# Patient Record
Sex: FEMALE | Race: BLACK OR AFRICAN AMERICAN | NOT HISPANIC OR LATINO | URBAN - METROPOLITAN AREA
[De-identification: names, ages, dates, MRNs, and addresses within clinical notes are randomized per-mention and may not be internally consistent; named-entity substitution may affect disease eponyms.]

---

## 2017-04-20 PROBLEM — Z00.00 ENCOUNTER FOR PREVENTIVE HEALTH EXAMINATION: Status: ACTIVE | Noted: 2017-04-20

## 2017-04-21 ENCOUNTER — OUTPATIENT (OUTPATIENT)
Dept: OUTPATIENT SERVICES | Facility: HOSPITAL | Age: 27
LOS: 1 days | End: 2017-04-21
Payer: COMMERCIAL

## 2017-04-21 ENCOUNTER — APPOINTMENT (OUTPATIENT)
Dept: CT IMAGING | Facility: CLINIC | Age: 27
End: 2017-04-21

## 2017-04-21 DIAGNOSIS — Z00.8 ENCOUNTER FOR OTHER GENERAL EXAMINATION: ICD-10-CM

## 2017-04-21 PROCEDURE — 70490 CT SOFT TISSUE NECK W/O DYE: CPT

## 2017-05-05 ENCOUNTER — APPOINTMENT (OUTPATIENT)
Dept: ULTRASOUND IMAGING | Facility: CLINIC | Age: 27
End: 2017-05-05

## 2017-05-05 ENCOUNTER — OUTPATIENT (OUTPATIENT)
Dept: OUTPATIENT SERVICES | Facility: HOSPITAL | Age: 27
LOS: 1 days | End: 2017-05-05
Payer: COMMERCIAL

## 2017-05-05 ENCOUNTER — RESULT REVIEW (OUTPATIENT)
Age: 27
End: 2017-05-05

## 2017-05-05 DIAGNOSIS — R22.1 LOCALIZED SWELLING, MASS AND LUMP, NECK: ICD-10-CM

## 2017-05-05 PROCEDURE — 76942 ECHO GUIDE FOR BIOPSY: CPT

## 2017-05-05 PROCEDURE — 10022: CPT

## 2017-06-12 ENCOUNTER — APPOINTMENT (OUTPATIENT)
Dept: OTOLARYNGOLOGY | Facility: CLINIC | Age: 27
End: 2017-06-12

## 2017-06-12 VITALS
WEIGHT: 293 LBS | HEART RATE: 80 BPM | HEIGHT: 70 IN | SYSTOLIC BLOOD PRESSURE: 141 MMHG | DIASTOLIC BLOOD PRESSURE: 87 MMHG | BODY MASS INDEX: 41.95 KG/M2

## 2017-06-12 DIAGNOSIS — Z80.3 FAMILY HISTORY OF MALIGNANT NEOPLASM OF BREAST: ICD-10-CM

## 2017-06-12 DIAGNOSIS — J45.909 UNSPECIFIED ASTHMA, UNCOMPLICATED: ICD-10-CM

## 2017-06-12 DIAGNOSIS — Z82.49 FAMILY HISTORY OF ISCHEMIC HEART DISEASE AND OTHER DISEASES OF THE CIRCULATORY SYSTEM: ICD-10-CM

## 2017-06-12 DIAGNOSIS — Z78.9 OTHER SPECIFIED HEALTH STATUS: ICD-10-CM

## 2017-07-06 ENCOUNTER — FORM ENCOUNTER (OUTPATIENT)
Age: 27
End: 2017-07-06

## 2017-07-07 ENCOUNTER — OUTPATIENT (OUTPATIENT)
Dept: OUTPATIENT SERVICES | Facility: HOSPITAL | Age: 27
LOS: 1 days | End: 2017-07-07
Payer: COMMERCIAL

## 2017-07-07 ENCOUNTER — APPOINTMENT (OUTPATIENT)
Dept: ULTRASOUND IMAGING | Facility: CLINIC | Age: 27
End: 2017-07-07

## 2017-07-07 DIAGNOSIS — R59.0 LOCALIZED ENLARGED LYMPH NODES: ICD-10-CM

## 2017-07-07 PROCEDURE — 76536 US EXAM OF HEAD AND NECK: CPT

## 2017-07-14 ENCOUNTER — APPOINTMENT (OUTPATIENT)
Dept: OTOLARYNGOLOGY | Facility: CLINIC | Age: 27
End: 2017-07-14

## 2017-07-14 VITALS
BODY MASS INDEX: 41.95 KG/M2 | OXYGEN SATURATION: 98 % | RESPIRATION RATE: 16 BRPM | SYSTOLIC BLOOD PRESSURE: 120 MMHG | HEIGHT: 70 IN | DIASTOLIC BLOOD PRESSURE: 80 MMHG | WEIGHT: 293 LBS | HEART RATE: 87 BPM

## 2017-07-16 ENCOUNTER — EMERGENCY (EMERGENCY)
Facility: HOSPITAL | Age: 27
LOS: 1 days | Discharge: HOME | End: 2017-07-16
Attending: EMERGENCY MEDICINE | Admitting: EMERGENCY MEDICINE
Payer: COMMERCIAL

## 2017-07-16 VITALS
DIASTOLIC BLOOD PRESSURE: 61 MMHG | SYSTOLIC BLOOD PRESSURE: 137 MMHG | RESPIRATION RATE: 18 BRPM | TEMPERATURE: 99 F | HEART RATE: 90 BPM | OXYGEN SATURATION: 99 %

## 2017-07-16 DIAGNOSIS — Z98.890 OTHER SPECIFIED POSTPROCEDURAL STATES: Chronic | ICD-10-CM

## 2017-07-16 LAB
ALBUMIN SERPL ELPH-MCNC: 3.9 G/DL — SIGNIFICANT CHANGE UP (ref 3.3–5)
ALP SERPL-CCNC: 89 U/L — SIGNIFICANT CHANGE UP (ref 40–120)
ALT FLD-CCNC: 22 U/L — SIGNIFICANT CHANGE UP (ref 4–33)
AST SERPL-CCNC: 20 U/L — SIGNIFICANT CHANGE UP (ref 4–32)
BASOPHILS # BLD AUTO: 0.02 K/UL — SIGNIFICANT CHANGE UP (ref 0–0.2)
BASOPHILS NFR BLD AUTO: 0.4 % — SIGNIFICANT CHANGE UP (ref 0–2)
BILIRUB SERPL-MCNC: < 0.2 MG/DL — LOW (ref 0.2–1.2)
BUN SERPL-MCNC: 9 MG/DL — SIGNIFICANT CHANGE UP (ref 7–23)
CALCIUM SERPL-MCNC: 9.5 MG/DL — SIGNIFICANT CHANGE UP (ref 8.4–10.5)
CHLORIDE SERPL-SCNC: 102 MMOL/L — SIGNIFICANT CHANGE UP (ref 98–107)
CO2 SERPL-SCNC: 28 MMOL/L — SIGNIFICANT CHANGE UP (ref 22–31)
CREAT SERPL-MCNC: 0.88 MG/DL — SIGNIFICANT CHANGE UP (ref 0.5–1.3)
EOSINOPHIL # BLD AUTO: 0.1 K/UL — SIGNIFICANT CHANGE UP (ref 0–0.5)
EOSINOPHIL NFR BLD AUTO: 1.8 % — SIGNIFICANT CHANGE UP (ref 0–6)
GLUCOSE SERPL-MCNC: 86 MG/DL — SIGNIFICANT CHANGE UP (ref 70–99)
HBV CORE AB SER-ACNC: NONREACTIVE — SIGNIFICANT CHANGE UP
HBV SURFACE AG SER-ACNC: NEGATIVE — SIGNIFICANT CHANGE UP
HCT VFR BLD CALC: 39.6 % — SIGNIFICANT CHANGE UP (ref 34.5–45)
HGB BLD-MCNC: 12.6 G/DL — SIGNIFICANT CHANGE UP (ref 11.5–15.5)
HIV1 AG SER QL: SIGNIFICANT CHANGE UP
HIV1+2 AB SPEC QL: SIGNIFICANT CHANGE UP
IMM GRANULOCYTES # BLD AUTO: 0.02 # — SIGNIFICANT CHANGE UP
IMM GRANULOCYTES NFR BLD AUTO: 0.4 % — SIGNIFICANT CHANGE UP (ref 0–1.5)
LYMPHOCYTES # BLD AUTO: 1.28 K/UL — SIGNIFICANT CHANGE UP (ref 1–3.3)
LYMPHOCYTES # BLD AUTO: 23.1 % — SIGNIFICANT CHANGE UP (ref 13–44)
MCHC RBC-ENTMCNC: 28.1 PG — SIGNIFICANT CHANGE UP (ref 27–34)
MCHC RBC-ENTMCNC: 31.8 % — LOW (ref 32–36)
MCV RBC AUTO: 88.4 FL — SIGNIFICANT CHANGE UP (ref 80–100)
MONOCYTES # BLD AUTO: 0.61 K/UL — SIGNIFICANT CHANGE UP (ref 0–0.9)
MONOCYTES NFR BLD AUTO: 11 % — SIGNIFICANT CHANGE UP (ref 2–14)
NEUTROPHILS # BLD AUTO: 3.52 K/UL — SIGNIFICANT CHANGE UP (ref 1.8–7.4)
NEUTROPHILS NFR BLD AUTO: 63.3 % — SIGNIFICANT CHANGE UP (ref 43–77)
NRBC # FLD: 0 — SIGNIFICANT CHANGE UP
PLATELET # BLD AUTO: 208 K/UL — SIGNIFICANT CHANGE UP (ref 150–400)
PMV BLD: 10.4 FL — SIGNIFICANT CHANGE UP (ref 7–13)
POTASSIUM SERPL-MCNC: 4.3 MMOL/L — SIGNIFICANT CHANGE UP (ref 3.5–5.3)
POTASSIUM SERPL-SCNC: 4.3 MMOL/L — SIGNIFICANT CHANGE UP (ref 3.5–5.3)
PROT SERPL-MCNC: 7 G/DL — SIGNIFICANT CHANGE UP (ref 6–8.3)
RBC # BLD: 4.48 M/UL — SIGNIFICANT CHANGE UP (ref 3.8–5.2)
RBC # FLD: 13.1 % — SIGNIFICANT CHANGE UP (ref 10.3–14.5)
SODIUM SERPL-SCNC: 140 MMOL/L — SIGNIFICANT CHANGE UP (ref 135–145)
WBC # BLD: 5.55 K/UL — SIGNIFICANT CHANGE UP (ref 3.8–10.5)
WBC # FLD AUTO: 5.55 K/UL — SIGNIFICANT CHANGE UP (ref 3.8–10.5)

## 2017-07-16 PROCEDURE — 70491 CT SOFT TISSUE NECK W/DYE: CPT | Mod: 26

## 2017-07-16 PROCEDURE — 99218: CPT

## 2017-07-16 RX ORDER — OXYCODONE AND ACETAMINOPHEN 5; 325 MG/1; MG/1
1 TABLET ORAL ONCE
Qty: 0 | Refills: 0 | Status: DISCONTINUED | OUTPATIENT
Start: 2017-07-16 | End: 2017-07-16

## 2017-07-16 RX ORDER — DEXAMETHASONE 0.5 MG/5ML
10 ELIXIR ORAL ONCE
Qty: 0 | Refills: 0 | Status: COMPLETED | OUTPATIENT
Start: 2017-07-16 | End: 2017-07-16

## 2017-07-16 RX ORDER — DEXAMETHASONE 0.5 MG/5ML
6 ELIXIR ORAL ONCE
Qty: 0 | Refills: 0 | Status: DISCONTINUED | OUTPATIENT
Start: 2017-07-16 | End: 2017-07-16

## 2017-07-16 RX ORDER — SODIUM CHLORIDE 9 MG/ML
1000 INJECTION INTRAMUSCULAR; INTRAVENOUS; SUBCUTANEOUS ONCE
Qty: 0 | Refills: 0 | Status: COMPLETED | OUTPATIENT
Start: 2017-07-16 | End: 2017-07-16

## 2017-07-16 RX ADMIN — Medication 102 MILLIGRAM(S): at 19:01

## 2017-07-16 RX ADMIN — Medication 100 MILLIGRAM(S): at 18:00

## 2017-07-16 RX ADMIN — OXYCODONE AND ACETAMINOPHEN 1 TABLET(S): 5; 325 TABLET ORAL at 14:09

## 2017-07-16 RX ADMIN — SODIUM CHLORIDE 1000 MILLILITER(S): 9 INJECTION INTRAMUSCULAR; INTRAVENOUS; SUBCUTANEOUS at 14:10

## 2017-07-16 NOTE — ED CDU PROVIDER NOTE - OBJECTIVE STATEMENT
27 year old female PMHx - asthma, right neck swelling   PSHx - FNA of neck - dx lymphadenopathy unknown source.  presents today with Right neck swelling increasing in size, burning, difficulty swallowing right ear pressure worse over last few days.  followed by Dr Castillo ENT - saw patient on friday and advised if symptoms worsen go to ED.  denies fever, chills, blurry vision, difficulty swallowing, sob

## 2017-07-16 NOTE — CONSULT NOTE ADULT - COMMENTS
Vital Signs Last 24 Hrs  T(C): 37 (16 Jul 2017 12:05), Max: 37 (16 Jul 2017 12:05)  T(F): 98.6 (16 Jul 2017 12:05), Max: 98.6 (16 Jul 2017 12:05)  HR: 90 (16 Jul 2017 12:05) (90 - 90)  BP: 137/61 (16 Jul 2017 12:05) (137/61 - 137/61)  BP(mean): --  RR: 18 (16 Jul 2017 12:05) (18 - 18)  SpO2: 99% (16 Jul 2017 12:05) (99% - 99%)

## 2017-07-16 NOTE — ED PROVIDER NOTE - PROGRESS NOTE DETAILS
Gollogly: ENT requesting non-urgent infectious workup (pt would normally get this outpt but has not done so yet) - will send HIV, quantiferon gold, EBV, etc at ENT's request. MD CHO:  Discussed pt with Dr. Powers, ENT.  He states that if CT demonstrates abscess, will admit.  If infected sialoadenitis, requests CDU for iv abx.  Discussed pt with Dr. Cox, IB[place].  He agrees with this plan.  Pending CT. Stephanie: Spoke with ENT resident. CT shows sialadenitis. Will start IV abx, decadron, and observe in CDU. Discussed findings/plan with pt.

## 2017-07-16 NOTE — ED CDU PROVIDER NOTE - MEDICAL DECISION MAKING DETAILS
right neck pain and swelling   -ent following suspecting right submandibular sialadenitis   - iv abx   -reevaluate in am

## 2017-07-16 NOTE — ED PROVIDER NOTE - MEDICAL DECISION MAKING DETAILS
27F w/subacute swelling of R neck. Followed by ENT. No airway compromise. Pt non-toxic appearing. Ddx: infectious LAD, lymphoma. Plan: labs, ucg, CT neck, ENT c/s, reassess.

## 2017-07-16 NOTE — CONSULT NOTE ADULT - ENMT COMMENTS
Flexible Fiberoptic Laryngoscopy: clear nasopharynx to glottis, true vocal cords mobile bilaterally, airway widely patent

## 2017-07-16 NOTE — ED CDU PROVIDER NOTE - PROGRESS NOTE DETAILS
TYRA Astudillo: Pt feeling better. Swelling improving. ENT seen pt at bedside this am. Pt stable for discharge, on clinda, and to follow up with ENT outpt. Pt agrees with plan. ATTENDING ATTESTATION NOTE (DR. Cox)  I have evaluated the patient and agree with the documentation and assessment as made by the PA. We have discussed plan of care and work up for the patient. TYRA Astudillo: Pt feeling better. Swelling improving. ENT seen pt at bedside this am. Pt stable for discharge, on clinda, and to follow up with ENT outpt. Pt agrees with plan.    I , Loyda Sosa M.D. have examined the patient and confirmed the essential components of the history, physical examination, diagnosis, and treatment plan. I agree with the patient's care as documented by the PA and amended herein by me. See note above for complete details of service.  CDU ATTG DISPO NOTE - Dr. Lazo - 28 yo F Hx Asthma, Lymphadenopathy confirmed on FNA by Dr. Castillo (ENT) who presented to ED for worsened swelling to R neck. Pt sent to CDU for IV ABX, serial exam and ENT eval. On exam airway intact, swelling noted to R submandibular region, lungs clear. No overlying cellulitis or fluctuance. Pt symptomatically improved with tx. ENT recs appreciated. Pt stable for discharge. PMD follow up within 24 - 48 hours. DC instructions and warning signs for return given.  ENT f/up as scheduled.

## 2017-07-16 NOTE — CONSULT NOTE ADULT - SUBJECTIVE AND OBJECTIVE BOX
HPI: 27 year old female with a past medical history significant for asthma who presents to the emergency room at [facility] with a chief complaint of right neck pain and swelling for the past several hours. Patient has had right neck swelling for over a year now and was recently seen by Dr. Castillo in the clinic for further evaluation. She had a CT neck 4/2 which showed lymphadenopathy R>L and had an FNA 5/5 which showed reactive lymphadenopathy but no malignancy. She was also treated with Augmentin a couple months ago. She has occasional difficulty with swallowing and changes in her voice. She denies noisy breathing or difficulty breathing.    PMHx:  Asthma    PSHx:  no ENT surgery    Meds:  none    SH:  denies tobacco  occasional ETOH    FH  mother alive/HTN  father alive/CAD    LABS  CBC Full  -  ( 16 Jul 2017 14:08 )  WBC Count : 5.55 K/uL  Hemoglobin : 12.6 g/dL  Hematocrit : 39.6 %  Platelet Count - Automated : 208 K/uL  Mean Cell Volume : 88.4 fL  Mean Cell Hemoglobin : 28.1 pg  Mean Cell Hemoglobin Concentration : 31.8 %  Auto Neutrophil # : 3.52 K/uL  Auto Lymphocyte # : 1.28 K/uL  Auto Monocyte # : 0.61 K/uL  Auto Eosinophil # : 0.10 K/uL  Auto Basophil # : 0.02 K/uL  Auto Neutrophil % : 63.3 %  Auto Lymphocyte % : 23.1 %  Auto Monocyte % : 11.0 %  Auto Eosinophil % : 1.8 %  Auto Basophil % : 0.4 %

## 2017-07-16 NOTE — ED PROVIDER NOTE - OBJECTIVE STATEMENT
27F h/o asthma p/w R neck swelling x 2 months. Pt being followed by Dr Castillo, had FNA in early May which showed lymphadenopathy. Put on abx for 10 days without improvement. Had US within the past week which showed continued LAD. Pt planning to get outpt CT neck. The swelling and associated pain has increased over the past 2 days. Pt c/o odynophagia and R ear pressure. No fevers, night sweats, difficulty breathing, congestion, or cough. No unintentional weight loss.

## 2017-07-16 NOTE — CHART NOTE - NSCHARTNOTEFT_GEN_A_CORE
[facility]  Head & Neck Surgery Procedure Note      Name:                  Nell Kaur C	             Surgeon:	Jignesh Powers MD	  				  Date of Procedure: 07/16/2017                          Assistant:  None    Record Number:	[id number]                             Anesthesia:  Local Anesthesia       Preoperative diagnosis:	Dysphagia, unspecified (R13.10)  	  Postoperative diagnosis:	Dysphagia, unspecified (R13.10)    Procedure:		Flexible Fiberoptic Laryngoscopy  (39576)    INDICATION:  23 year old female with a past medical history significant for pituitary mass, Hypoglycemia, who presents to the emergency room at [facility] with a chief complaint of throat closing up feeling since last night. She first felt difficulty swallowing before she fell asleep last night. She then got better and went to sleep. The feeling came back again in the middle of the night and again this morning. She denies feeling short of breath or noisy breathing. She has a changes in her voice sometimes. She denies difficulty breathing    PROCEDURE: The patient was seen and her bilateral nasal cavities were prepped and sprayed with topical anesthesia of neosynephrine.   Following this, a fiberoptic flexible laryngoscope was passed into the left nasal cavity, and then slowly and meticulously moved posteriorly to the nasopharynx.  There was no evidence of clotted blood within the left anterior and posterior superior lateral nasal wall. There was clear mucous within the nasal cavity.  Once at nasopharynx the skull base and lateral walls of the eustachian tubes were examined for lesions and masses.  There was no blood or masses within the nasopharynx. There was mild prominence of the nasopharyngeal soft tissue consistent with inflammatory reaction.  The fiberoptic endoscope was then carefully directed inferiorly and moved to the hypopharynx and glottis.  There was no fullness of the base of tongue.  There was 1-2+ prominence of the tonsils bilaterally (equally) and without exudate. There was no evidence of retropharyngeal erythema or edema.  There was mild  diffuse erythema  of the pharyngeal wall and supraglottic structure consistent with GERD.  The true vocal cords appeared to be mobile bilaterally.  There was no evidence of pooling of secretions, or obvious aspiration or penetration of liquid into the glottis.  The airway was widely patent. The patient tolerated the procedure well.. [facility]  Head & Neck Surgery Procedure Note      Name:                  Nell Kaur C	             Surgeon:	Jignesh Powers MD	  				  Date of Procedure: 07/16/2017                          Assistant:  None    Record Number:	[id number]                             Anesthesia:  Local Anesthesia       Preoperative diagnosis:	Dysphagia, unspecified (R13.10)  	  Postoperative diagnosis:	Dysphagia, unspecified (R13.10)    Procedure:		Flexible Fiberoptic Laryngoscopy  (59050)    INDICATION:  27 year old female with a past medical history significant for asthma who presents to the emergency room at [facility] with a chief complaint of right neck pain and swelling for the past several hours. Patient has had right neck swelling for over a year now and was recently seen by Dr. Castillo in the clinic for further evaluation. She had a CT neck 4/2 which showed lymphadenopathy R>L and had an FNA 5/5 which showed reactive lymphadenopathy but no malignancy. She was also treated with Augmentin a couple months ago. She has occasional difficulty with swallowing and changes in her voice. She denies noisy breathing or difficulty breathing.    PROCEDURE: The patient was seen and her bilateral nasal cavities were prepped and sprayed with topical anesthesia of neosynephrine.   Following this, a fiberoptic flexible laryngoscope was passed into the left nasal cavity, and then slowly and meticulously moved posteriorly to the nasopharynx.  There was no evidence of clotted blood within the left anterior and posterior superior lateral nasal wall. There was minimal clear mucous within the nasal cavity.  Once at nasopharynx the skull base and lateral walls of the eustachian tubes were examined for lesions and masses.  There was no blood or masses within the nasopharynx. There was mild prominence of the nasopharyngeal soft tissue consistent with inflammatory reaction.  The fiberoptic endoscope was then carefully directed inferiorly and moved to the hypopharynx and glottis.  There was no fullness of the base of tongue.  There was 1-2+ prominence of the tonsils bilaterally (equally) and without exudate. There was no evidence of retropharyngeal erythema or edema.  There was mild  diffuse erythema  of the pharyngeal wall and supraglottic structure consistent with GERD.  The true vocal cords appeared to be mobile bilaterally.  There was no evidence of pooling of secretions, or obvious aspiration or penetration of liquid into the glottis.  The airway was widely patent. The patient tolerated the procedure well..

## 2017-07-16 NOTE — CONSULT NOTE ADULT - SUBJECTIVE AND OBJECTIVE BOX
HPI:  27F w/ h/o asthma presenting with worsening R neck swelling. Patient has had right neck swelling for over a year now and was recently seen by Dr. Castillo in the clinic for further evaluation. She had a CT neck 4/2 which showed lymphadenopathy R>L and had an FNA 5/5 which showed reactive lymphadenopathy but no malignancy. She was also treated with Augmentin a couple months ago without effect. Denies SOB, dysphagia. Complains of odynophagia from the pain. Has not noted any other lumps/masses. No recent labs available.    PAST MEDICAL & SURGICAL HISTORY:  Asthma    Allergies  No Known Allergies    REVIEW OF SYSTEMS:  CONSTITUTIONAL: No fever, weight loss, or fatigue  EYES: No eye pain, visual disturbances, or discharge  ENMT: see HPI  NECK: see HPI  RESPIRATORY: No cough, wheezing, chills or hemoptysis; No shortness of breath  CARDIOVASCULAR: No chest pain, palpitations, dizziness, or leg swelling  GASTROINTESTINAL: No abdominal or epigastric pain. No nausea, vomiting, or hematemesis; No diarrhea or constipation. No melena or hematochezia.  GENITOURINARY: No dysuria, frequency, hematuria, or incontinence  NEUROLOGICAL: No headaches, loss of strength, numbness, or tremors  LYMPH NODES: No enlarged glands  ENDOCRINE: No heat or cold intolerance; No hair loss  MUSCULOSKELETAL: No joint pain or swelling; No muscle, back, or extremity pain  PSYCHIATRIC: No depression, anxiety, mood swings, or difficulty sleeping    Vital Signs Last 24 Hrs  T(C): 37 (16 Jul 2017 12:05), Max: 37 (16 Jul 2017 12:05)  T(F): 98.6 (16 Jul 2017 12:05), Max: 98.6 (16 Jul 2017 12:05)  HR: 90 (16 Jul 2017 12:05) (90 - 90)  BP: 137/61 (16 Jul 2017 12:05) (137/61 - 137/61)  BP(mean): --  RR: 18 (16 Jul 2017 12:05) (18 - 18)  SpO2: 99% (16 Jul 2017 12:05) (99% - 99%)      PHYSICAL EXAM:  NAD, alert, awake  Ears: pinnae wnl  Nose: no deformities, mucosa moist and pink  OC/OP: tongue midline, FOM soft and flat, no purulence expressed from Gildardo or Warton's duct, tonsils +1  Neck: soft, non-fluctuant right neck swelling extending over the mandible and the submandibular region, tender on palpation.  . TgdoBcqmSxuew63Qya TwrotXtoozui5Oafew PmvzwLgrsqmm7Skt XfgofVlhaguv014Bjpsz JoixmHrqxkpw833Oct CufbnZorhnmq251Dmdth SpbdsBqowiwj360Lat YofmjPzdtvvb010Wyjan ZuvasZvhvjno923Qee [id number] HPI:  27F w/ h/o asthma presenting with worsening R neck swelling. Patient has had right neck swelling for over a year now and was recently seen by Dr. Castillo in the clinic for further evaluation. She had a CT neck 4/2 which showed lymphadenopathy R>L and had an FNA 5/5 which showed reactive lymphadenopathy but no malignancy. She was also treated with Augmentin a couple months ago without effect. Denies SOB, dysphagia. Complains of odynophagia from the pain. Has not noted any other lumps/masses. No recent labs available.    PAST MEDICAL & SURGICAL HISTORY:  Asthma    Allergies  No Known Allergies    REVIEW OF SYSTEMS:  CONSTITUTIONAL: No fever, weight loss, or fatigue  EYES: No eye pain, visual disturbances, or discharge  ENMT: see HPI  NECK: see HPI  RESPIRATORY: No cough, wheezing, chills or hemoptysis; No shortness of breath  CARDIOVASCULAR: No chest pain, palpitations, dizziness, or leg swelling  GASTROINTESTINAL: No abdominal or epigastric pain. No nausea, vomiting, or hematemesis; No diarrhea or constipation. No melena or hematochezia.  GENITOURINARY: No dysuria, frequency, hematuria, or incontinence  NEUROLOGICAL: No headaches, loss of strength, numbness, or tremors  LYMPH NODES: No enlarged glands  ENDOCRINE: No heat or cold intolerance; No hair loss  MUSCULOSKELETAL: No joint pain or swelling; No muscle, back, or extremity pain  PSYCHIATRIC: No depression, anxiety, mood swings, or difficulty sleeping    Vital Signs Last 24 Hrs  T(C): 37 (16 Jul 2017 12:05), Max: 37 (16 Jul 2017 12:05)  T(F): 98.6 (16 Jul 2017 12:05), Max: 98.6 (16 Jul 2017 12:05)  HR: 90 (16 Jul 2017 12:05) (90 - 90)  BP: 137/61 (16 Jul 2017 12:05) (137/61 - 137/61)  BP(mean): --  RR: 18 (16 Jul 2017 12:05) (18 - 18)  SpO2: 99% (16 Jul 2017 12:05) (99% - 99%)    PHYSICAL EXAM:  NAD, alert, awake  Ears: pinnae wnl  Nose: no deformities, mucosa moist and pink  OC/OP: tongue midline, FOM soft and flat, no purulence expressed from Gildardo or Warton's duct, tonsils +1  Neck: soft, non-fluctuant right neck swelling extending over the mandible and the submandibular region, tender on palpation. No palpable discrete right or left LAD. Thyroid non-enlarged.  Breathing comfortably on RA, no stridor or stertor    A/P  27F w/ right neck mass for over a year which has not been getting better and is most concerning for atypical infection vs malignancy.  -repeat CT neck with contrast  -labs: CBC, ESR, EBV, CMV, toxo, Tb, HIV  -discussed with attending  -call/page with questions    . YbyqBfuzCnnvg31Pun OuklfSodufuy0Tbfuu QwnoeFssyfjc8Ugp GlyulFjxwvvc169Sodqm PvsvuIairryd346Jlb YxfqxAhfurja847Jnzez HpzrsCxnkkvu634Tkw KdvxjLnvyzum138Zmwgn MujjtHjbhqcy867Oce [id number]-[id number]

## 2017-07-16 NOTE — ED PROVIDER NOTE - ATTENDING CONTRIBUTION TO CARE
DR. BEAR, ATTENDING MD-  I performed a face to face bedside interview with patient regarding history of present illness, review of symptoms and past medical history. I completed an independent physical exam.  I have discussed patient's plan of care with the resident.   Documentation as above in the note.    28 y/o female with h/o right submandibular mass x2 months, closely followed by ENT, here for worse pain and swelling to mass x2 days.  Pt states she has had FNA, recent u/s of mass and abx course, but continued sx.  Last saw her ENT 2 days ago who recommended o/p CT scan.  Denies f/c, ha, neck stiffness, cp, sob, cough, abd pain, n/v/d, dysuria, rash.  Afebrile, vs wnl, nad, ctabil, s1s2 rrr no m/r/g, abd soft non ttp no r/g, no cva tenderness b/l, no leg swelling b/l, no rash, large right submandibular swelling, no fluctuance, no overlying erythema or induration, ttp, extends to ant auricular region, full rom neck, uvula midline, no sublingual edema, no voice changes, no stridor or bruit on auscultation.  Lymphadenopathy vs abscess vs submandibular sialoadenitis.  No airway compromise.  Obtain cbc, bmp, ct scan, ent consultation.

## 2017-07-16 NOTE — ED CDU PROVIDER NOTE - ATTENDING CONTRIBUTION TO CARE
Attending Note (Dr. Cox)  27 yr old female with hx of asthma presents with cervical lymphadenopathy which has not improved with PO abx.  States swelling increasing in right neck prompting her to come in.  Denies SOB, stridor, chest pain, fever.  Had FNA recently done by ENT  PE: well appearing; right neck swollen +LAD; CTAB/L; s1 s2 no m/r/g abd soft/NT/ND ext: no edema

## 2017-07-16 NOTE — ED ADULT TRIAGE NOTE - CHIEF COMPLAINT QUOTE
pt c/o swelling to right side of neck for the past 2 months, states that she had a bx in May and nothing was found, still c/o swelling pain with swallowing and right ear congestion

## 2017-07-16 NOTE — CONSULT NOTE ADULT - ASSESSMENT
Assessment:  27 year old female with a past medical history significant for asthma who presents to the emergency room at [facility] with a chief complaint of right neck pain and swelling for the past several hours. DDx: right acute lymphadenitis, right submandibular abscess, right submandibular sialalithiasis, versus more likely right submandibular sialadenitis, airway widely patent on endoscopy.    Plan:  1) check Cat Scan of Neck with IV Contrast  2) IV clindaycin 900mg q8hrs  3) decadron 10mg IV q8hrs x 24 hours  4) airway montitoring in the CDU

## 2017-07-16 NOTE — ED PROVIDER NOTE - ENMT, MLM
Airway patent, Nasal mucosa clear. Mouth with normal mucosa. Throat has no vesicles, no oropharyngeal exudates and uvula is midline. No masses under tongue, floor of mouth is soft. +tender submandibular, anterior auricular, and anterior cervical LAD. Airway patent, Nasal mucosa clear. Mouth with normal mucosa. Throat has no vesicles, no oropharyngeal exudates and uvula is midline. No masses under tongue, floor of mouth is soft. +tender, non-fluctuant submandibular and anterior cervical LAD.

## 2017-07-17 VITALS
TEMPERATURE: 97 F | RESPIRATION RATE: 18 BRPM | HEART RATE: 79 BPM | OXYGEN SATURATION: 100 % | DIASTOLIC BLOOD PRESSURE: 66 MMHG | SYSTOLIC BLOOD PRESSURE: 125 MMHG

## 2017-07-17 LAB
CMV DNA CSF QL NAA+PROBE: NOT DETECTED IU/ML — SIGNIFICANT CHANGE UP
EBV EA AB TITR SER IF: POSITIVE — SIGNIFICANT CHANGE UP
EBV EA IGG SER-ACNC: NEGATIVE — SIGNIFICANT CHANGE UP
EBV PATRN SPEC IB-IMP: SIGNIFICANT CHANGE UP
EBV VCA IGG AVIDITY SER QL IA: POSITIVE — SIGNIFICANT CHANGE UP
EBV VCA IGM TITR FLD: NEGATIVE — SIGNIFICANT CHANGE UP
HBA1C BLD-MCNC: 5.8 % — HIGH (ref 4–5.6)
HCV RNA SERPL NAA DL=5-ACNC: NOT DETECTED IU/ML — SIGNIFICANT CHANGE UP
HCV RNA SPEC NAA+PROBE-LOG IU: SIGNIFICANT CHANGE UP LOGIU/ML

## 2017-07-17 PROCEDURE — 99217: CPT

## 2017-07-17 RX ADMIN — Medication 100 MILLIGRAM(S): at 02:00

## 2017-07-17 RX ADMIN — Medication 100 MILLIGRAM(S): at 09:31

## 2017-07-17 NOTE — PROGRESS NOTE ADULT - SUBJECTIVE AND OBJECTIVE BOX
Pt seen and examined, no acute events overnight, pain much improved    avss  nad, lying in bed  nonlabored breathing on RA  nc clear  OC/OP clear, no pus or drainage from whartons ducts b/l  R submandibular gland swelling improving, still with mild ttp, no fluctuance, +firm    A/P: 27F w/ R submandibular sialadenitis  -transition to PO clinda for 7 day course  -warm compresses  -gland massages  -sialogogues  -can f/u ORl as outpatient [phone] in 2-3 weeks  -call with questions

## 2017-07-21 LAB — T GONDII DNA SPEC QL NAA+PROBE: NEGATIVE — SIGNIFICANT CHANGE UP

## 2017-07-26 ENCOUNTER — INPATIENT (INPATIENT)
Facility: HOSPITAL | Age: 27
LOS: 3 days | Discharge: HOME | End: 2017-07-30
Attending: HOSPITALIST | Admitting: HOSPITALIST
Payer: COMMERCIAL

## 2017-07-26 VITALS
TEMPERATURE: 100 F | SYSTOLIC BLOOD PRESSURE: 140 MMHG | OXYGEN SATURATION: 100 % | RESPIRATION RATE: 16 BRPM | HEART RATE: 100 BPM | DIASTOLIC BLOOD PRESSURE: 69 MMHG

## 2017-07-26 DIAGNOSIS — R63.8 OTHER SYMPTOMS AND SIGNS CONCERNING FOOD AND FLUID INTAKE: ICD-10-CM

## 2017-07-26 DIAGNOSIS — K11.20 SIALOADENITIS, UNSPECIFIED: ICD-10-CM

## 2017-07-26 DIAGNOSIS — R82.90 UNSPECIFIED ABNORMAL FINDINGS IN URINE: ICD-10-CM

## 2017-07-26 DIAGNOSIS — Z29.9 ENCOUNTER FOR PROPHYLACTIC MEASURES, UNSPECIFIED: ICD-10-CM

## 2017-07-26 DIAGNOSIS — Z98.890 OTHER SPECIFIED POSTPROCEDURAL STATES: Chronic | ICD-10-CM

## 2017-07-26 DIAGNOSIS — R59.1 GENERALIZED ENLARGED LYMPH NODES: ICD-10-CM

## 2017-07-26 DIAGNOSIS — R73.03 PREDIABETES: ICD-10-CM

## 2017-07-26 DIAGNOSIS — J45.909 UNSPECIFIED ASTHMA, UNCOMPLICATED: ICD-10-CM

## 2017-07-26 LAB
ALBUMIN SERPL ELPH-MCNC: 4 G/DL — SIGNIFICANT CHANGE UP (ref 3.3–5)
ALP SERPL-CCNC: 84 U/L — SIGNIFICANT CHANGE UP (ref 40–120)
ALT FLD-CCNC: 14 U/L — SIGNIFICANT CHANGE UP (ref 4–33)
APPEARANCE UR: CLEAR — SIGNIFICANT CHANGE UP
APTT BLD: 29.6 SEC — SIGNIFICANT CHANGE UP (ref 27.5–37.4)
AST SERPL-CCNC: 21 U/L — SIGNIFICANT CHANGE UP (ref 4–32)
BACTERIA # UR AUTO: SIGNIFICANT CHANGE UP
BASOPHILS # BLD AUTO: 0.01 K/UL — SIGNIFICANT CHANGE UP (ref 0–0.2)
BASOPHILS NFR BLD AUTO: 0.2 % — SIGNIFICANT CHANGE UP (ref 0–2)
BILIRUB SERPL-MCNC: 0.3 MG/DL — SIGNIFICANT CHANGE UP (ref 0.2–1.2)
BILIRUB UR-MCNC: NEGATIVE — SIGNIFICANT CHANGE UP
BLD GP AB SCN SERPL QL: NEGATIVE — SIGNIFICANT CHANGE UP
BLOOD UR QL VISUAL: NEGATIVE — SIGNIFICANT CHANGE UP
BUN SERPL-MCNC: 8 MG/DL — SIGNIFICANT CHANGE UP (ref 7–23)
CALCIUM SERPL-MCNC: 9 MG/DL — SIGNIFICANT CHANGE UP (ref 8.4–10.5)
CHLORIDE SERPL-SCNC: 99 MMOL/L — SIGNIFICANT CHANGE UP (ref 98–107)
CO2 SERPL-SCNC: 22 MMOL/L — SIGNIFICANT CHANGE UP (ref 22–31)
COLOR SPEC: YELLOW — SIGNIFICANT CHANGE UP
CREAT SERPL-MCNC: 0.99 MG/DL — SIGNIFICANT CHANGE UP (ref 0.5–1.3)
EOSINOPHIL # BLD AUTO: 0.16 K/UL — SIGNIFICANT CHANGE UP (ref 0–0.5)
EOSINOPHIL NFR BLD AUTO: 2.6 % — SIGNIFICANT CHANGE UP (ref 0–6)
GLUCOSE SERPL-MCNC: 116 MG/DL — HIGH (ref 70–99)
GLUCOSE UR-MCNC: NEGATIVE — SIGNIFICANT CHANGE UP
HCT VFR BLD CALC: 38.9 % — SIGNIFICANT CHANGE UP (ref 34.5–45)
HGB BLD-MCNC: 12.6 G/DL — SIGNIFICANT CHANGE UP (ref 11.5–15.5)
IMM GRANULOCYTES # BLD AUTO: 0.03 # — SIGNIFICANT CHANGE UP
IMM GRANULOCYTES NFR BLD AUTO: 0.5 % — SIGNIFICANT CHANGE UP (ref 0–1.5)
INR BLD: 1.09 — SIGNIFICANT CHANGE UP (ref 0.88–1.17)
KETONES UR-MCNC: NEGATIVE — SIGNIFICANT CHANGE UP
LEUKOCYTE ESTERASE UR-ACNC: HIGH
LYMPHOCYTES # BLD AUTO: 0.88 K/UL — LOW (ref 1–3.3)
LYMPHOCYTES # BLD AUTO: 14.1 % — SIGNIFICANT CHANGE UP (ref 13–44)
MAGNESIUM SERPL-MCNC: 1.8 MG/DL — SIGNIFICANT CHANGE UP (ref 1.6–2.6)
MCHC RBC-ENTMCNC: 28.6 PG — SIGNIFICANT CHANGE UP (ref 27–34)
MCHC RBC-ENTMCNC: 32.4 % — SIGNIFICANT CHANGE UP (ref 32–36)
MCV RBC AUTO: 88.2 FL — SIGNIFICANT CHANGE UP (ref 80–100)
MONOCYTES # BLD AUTO: 0.53 K/UL — SIGNIFICANT CHANGE UP (ref 0–0.9)
MONOCYTES NFR BLD AUTO: 8.5 % — SIGNIFICANT CHANGE UP (ref 2–14)
MUCOUS THREADS # UR AUTO: SIGNIFICANT CHANGE UP
NEUTROPHILS # BLD AUTO: 4.62 K/UL — SIGNIFICANT CHANGE UP (ref 1.8–7.4)
NEUTROPHILS NFR BLD AUTO: 74.1 % — SIGNIFICANT CHANGE UP (ref 43–77)
NITRITE UR-MCNC: NEGATIVE — SIGNIFICANT CHANGE UP
NON-SQ EPI CELLS # UR AUTO: <1 — SIGNIFICANT CHANGE UP
NRBC # FLD: 0 — SIGNIFICANT CHANGE UP
PH UR: 5.5 — SIGNIFICANT CHANGE UP (ref 4.6–8)
PLATELET # BLD AUTO: 212 K/UL — SIGNIFICANT CHANGE UP (ref 150–400)
PMV BLD: 10.8 FL — SIGNIFICANT CHANGE UP (ref 7–13)
POTASSIUM SERPL-MCNC: 4.1 MMOL/L — SIGNIFICANT CHANGE UP (ref 3.5–5.3)
POTASSIUM SERPL-SCNC: 4.1 MMOL/L — SIGNIFICANT CHANGE UP (ref 3.5–5.3)
PROT SERPL-MCNC: 7.5 G/DL — SIGNIFICANT CHANGE UP (ref 6–8.3)
PROT UR-MCNC: 30 — HIGH
PROTHROM AB SERPL-ACNC: 12.2 SEC — SIGNIFICANT CHANGE UP (ref 9.8–13.1)
RBC # BLD: 4.41 M/UL — SIGNIFICANT CHANGE UP (ref 3.8–5.2)
RBC # FLD: 13.2 % — SIGNIFICANT CHANGE UP (ref 10.3–14.5)
RBC CASTS # UR COMP ASSIST: SIGNIFICANT CHANGE UP (ref 0–?)
RH IG SCN BLD-IMP: POSITIVE — SIGNIFICANT CHANGE UP
SODIUM SERPL-SCNC: 136 MMOL/L — SIGNIFICANT CHANGE UP (ref 135–145)
SP GR SPEC: 1.03 — SIGNIFICANT CHANGE UP (ref 1–1.03)
SQUAMOUS # UR AUTO: SIGNIFICANT CHANGE UP
UROBILINOGEN FLD QL: NORMAL E.U. — SIGNIFICANT CHANGE UP (ref 0.1–0.2)
WBC # BLD: 6.23 K/UL — SIGNIFICANT CHANGE UP (ref 3.8–10.5)
WBC # FLD AUTO: 6.23 K/UL — SIGNIFICANT CHANGE UP (ref 3.8–10.5)
WBC UR QL: SIGNIFICANT CHANGE UP (ref 0–?)

## 2017-07-26 PROCEDURE — 70491 CT SOFT TISSUE NECK W/DYE: CPT | Mod: 26

## 2017-07-26 PROCEDURE — 99223 1ST HOSP IP/OBS HIGH 75: CPT

## 2017-07-26 RX ORDER — LACTOBACILLUS ACIDOPHILUS 100MM CELL
1 CAPSULE ORAL DAILY
Qty: 0 | Refills: 0 | Status: DISCONTINUED | OUTPATIENT
Start: 2017-07-26 | End: 2017-07-30

## 2017-07-26 RX ORDER — SODIUM CHLORIDE 9 MG/ML
1000 INJECTION INTRAMUSCULAR; INTRAVENOUS; SUBCUTANEOUS
Qty: 0 | Refills: 0 | Status: DISCONTINUED | OUTPATIENT
Start: 2017-07-26 | End: 2017-07-28

## 2017-07-26 RX ORDER — FAMOTIDINE 10 MG/ML
20 INJECTION INTRAVENOUS
Qty: 0 | Refills: 0 | Status: COMPLETED | OUTPATIENT
Start: 2017-07-26 | End: 2017-07-29

## 2017-07-26 RX ORDER — SODIUM CHLORIDE 9 MG/ML
1000 INJECTION INTRAMUSCULAR; INTRAVENOUS; SUBCUTANEOUS ONCE
Qty: 0 | Refills: 0 | Status: COMPLETED | OUTPATIENT
Start: 2017-07-26 | End: 2017-07-26

## 2017-07-26 RX ORDER — ALBUTEROL 90 UG/1
2 AEROSOL, METERED ORAL EVERY 4 HOURS
Qty: 0 | Refills: 0 | Status: DISCONTINUED | OUTPATIENT
Start: 2017-07-26 | End: 2017-07-30

## 2017-07-26 RX ORDER — ONDANSETRON 8 MG/1
4 TABLET, FILM COATED ORAL EVERY 8 HOURS
Qty: 0 | Refills: 0 | Status: DISCONTINUED | OUTPATIENT
Start: 2017-07-26 | End: 2017-07-30

## 2017-07-26 RX ORDER — AMPICILLIN SODIUM AND SULBACTAM SODIUM 250; 125 MG/ML; MG/ML
3 INJECTION, POWDER, FOR SUSPENSION INTRAMUSCULAR; INTRAVENOUS ONCE
Qty: 0 | Refills: 0 | Status: COMPLETED | OUTPATIENT
Start: 2017-07-26 | End: 2017-07-26

## 2017-07-26 RX ORDER — ACETAMINOPHEN 500 MG
650 TABLET ORAL EVERY 6 HOURS
Qty: 0 | Refills: 0 | Status: DISCONTINUED | OUTPATIENT
Start: 2017-07-26 | End: 2017-07-30

## 2017-07-26 RX ORDER — IPRATROPIUM/ALBUTEROL SULFATE 18-103MCG
3 AEROSOL WITH ADAPTER (GRAM) INHALATION EVERY 6 HOURS
Qty: 0 | Refills: 0 | Status: DISCONTINUED | OUTPATIENT
Start: 2017-07-26 | End: 2017-07-30

## 2017-07-26 RX ORDER — AMPICILLIN SODIUM AND SULBACTAM SODIUM 250; 125 MG/ML; MG/ML
1.5 INJECTION, POWDER, FOR SUSPENSION INTRAMUSCULAR; INTRAVENOUS EVERY 6 HOURS
Qty: 0 | Refills: 0 | Status: DISCONTINUED | OUTPATIENT
Start: 2017-07-26 | End: 2017-07-28

## 2017-07-26 RX ORDER — DIPHENHYDRAMINE HCL 50 MG
50 CAPSULE ORAL ONCE
Qty: 0 | Refills: 0 | Status: COMPLETED | OUTPATIENT
Start: 2017-07-26 | End: 2017-07-26

## 2017-07-26 RX ORDER — ACETAMINOPHEN 500 MG
650 TABLET ORAL ONCE
Qty: 0 | Refills: 0 | Status: COMPLETED | OUTPATIENT
Start: 2017-07-26 | End: 2017-07-26

## 2017-07-26 RX ORDER — ONDANSETRON 8 MG/1
4 TABLET, FILM COATED ORAL ONCE
Qty: 0 | Refills: 0 | Status: COMPLETED | OUTPATIENT
Start: 2017-07-26 | End: 2017-07-26

## 2017-07-26 RX ORDER — FAMOTIDINE 10 MG/ML
20 INJECTION INTRAVENOUS ONCE
Qty: 0 | Refills: 0 | Status: COMPLETED | OUTPATIENT
Start: 2017-07-26 | End: 2017-07-26

## 2017-07-26 RX ADMIN — FAMOTIDINE 20 MILLIGRAM(S): 10 INJECTION INTRAVENOUS at 15:25

## 2017-07-26 RX ADMIN — Medication 50 MILLIGRAM(S): at 22:31

## 2017-07-26 RX ADMIN — Medication 650 MILLIGRAM(S): at 15:25

## 2017-07-26 RX ADMIN — SODIUM CHLORIDE 1000 MILLILITER(S): 9 INJECTION INTRAMUSCULAR; INTRAVENOUS; SUBCUTANEOUS at 15:34

## 2017-07-26 RX ADMIN — AMPICILLIN SODIUM AND SULBACTAM SODIUM 200 GRAM(S): 250; 125 INJECTION, POWDER, FOR SUSPENSION INTRAMUSCULAR; INTRAVENOUS at 15:57

## 2017-07-26 RX ADMIN — ONDANSETRON 4 MILLIGRAM(S): 8 TABLET, FILM COATED ORAL at 15:25

## 2017-07-26 RX ADMIN — Medication 125 MILLIGRAM(S): at 15:25

## 2017-07-26 NOTE — H&P ADULT - HISTORY OF PRESENT ILLNESS
27 year old female, with past history significant for Asthma, Lymphadenopathy, Fine needle aspiration, presented to the ED secondary to generalized rash.  Seen and evaluated at bedside;      Vital signs upon ED presentation as follows: BP = 140/69, Hr = 100, RR = 16, T = 37.5 C (99.5 F), O2 Sat = 100% on RA.  CT of Neck Soft Tissue w/ IC reflects "Redemonstration of acute right submandibular gland sialoadenitis.  Extensive surrounding inflammatory change appears grossly unchanged compared to prior study.  No abscess collection is seen. No radiopaque calculi or ductal dilatation.  Extensive right-sided cervical adenopathy which has increased from the prior exam. Considerations include a viral etiology or lymphoproliferative disorder."  Diagnosed with Sialedinitis.  Prescribed unasyn 3 grams, solu-medrol 125 mg, zofran 4 mg, pepcid 20 mg, tylenol 650 mg and sodium chloride 1 liter x one. 27 year old female, with past history significant for Asthma, Lymphadenopathy, Fine needle aspiration, presented to the ED secondary to generalized rash and right face/neck swelling.  Seen and evaluated at bedside; NAD.  Patient relates that she was in the ED approximately 1.5 weeks ago and was prescribed clindamycin for infection of the salivary gland.  Notes that from the initial dose of clindamycin minor rashes were present on her thighs, but these were rationalized to be due to heat rash.  However, over time, as patient continued her TID clindamycin, she noted worsening erythematous, itchy rashes, which progressed to envelop the entire body.  Swelling of the jaw worsened and expanded from the right side of the jaw to underneath the jaw and the upper neck.  Had associated pain and difficulty swallowing.  Sometimes at nights, patient felt as if she was choking on her saliva because of inadequate ability to clear the secretions.  Stopped taking Clindamycin on Monday.  Subsequently, patient decided to return to the ED for evaluation/management.  Reports of associated chills and had bout of nausea with vomiting.    As past history of swelling, patient reports noting a palpable cyst-like structure underneath the jaw in January 2017.  Subsequently evaluated by ENT and underwent CT scan of the neck, which demonstrated 7 to 8 enlarged lymph nodes.  In May, patient had fine needle aspiration of the cyst and after about 1.5 to 2 weeks post procedure, patient began to suffer with intermittent swelling of the right jaw, with never complete resolution.  In addition to home therapies (massage, warm compresses), patient was treated with Augmentin twice, but the swelling continued to occur.    Vital signs upon ED presentation as follows: BP = 140/69, Hr = 100, RR = 16, T = 37.5 C (99.5 F), O2 Sat = 100% on RA.  CT of Neck Soft Tissue w/ IC reflects "Redemonstration of acute right submandibular gland sialoadenitis.  Extensive surrounding inflammatory change appears grossly unchanged compared to prior study.  No abscess collection is seen. No radiopaque calculi or ductal dilatation.  Extensive right-sided cervical adenopathy which has increased from the prior exam. Considerations include a viral etiology or lymphoproliferative disorder."  Diagnosed with Sialedinitis.  Prescribed unasyn 3 grams, solu-medrol 125 mg, zofran 4 mg, pepcid 20 mg, tylenol 650 mg and sodium chloride 1 liter x one.  Already evaluated by ENT team in the ED, per reports (awaiting consult note). 27 year old female, with past history significant for Asthma, Lymphadenopathy, Fine needle aspiration, presented to the ED secondary to generalized rash and right face/neck swelling.  Seen and evaluated at bedside; NAD.  Patient relates that she was in the ED approximately 1.5 weeks ago and was prescribed clindamycin for infection of the salivary gland.  Notes that from the initial dose of clindamycin minor rashes were present on her thighs, but these were rationalized to be due to heat rash.  However, over time, as patient continued her TID clindamycin, she noted worsening erythematous, itchy rashes, which progressed to envelop the entire body.  Swelling of the jaw worsened and expanded from the right side of the jaw to underneath the jaw and the upper neck.  Had associated pain and difficulty swallowing.  Sometimes at nights, patient felt as if she was choking on her saliva because of inadequate ability to clear the secretions.  Stopped taking Clindamycin on Monday.  Subsequently, patient decided to return to the ED for evaluation/management.  Reports of associated chills and had bout of nausea with vomiting.    As past history of swelling, patient reports noting a palpable cyst-like structure underneath the jaw in January 2017.  Subsequently evaluated by ENT and underwent CT scan of the neck, which demonstrated 7 to 8 enlarged lymph nodes.  In May, patient had fine needle aspiration of the cyst and after about 1.5 to 2 weeks post procedure, patient began to suffer with intermittent swelling of the right jaw, with never complete resolution.  In addition to home therapies (massage, warm compresses), patient was treated with Augmentin twice, but the swelling continued to occur.    Vital signs upon ED presentation as follows: BP = 140/69, Hr = 100, RR = 16, T = 37.5 C (99.5 F), O2 Sat = 100% on RA.  CT of Neck Soft Tissue w/ IC reflects "Redemonstration of acute right submandibular gland sialoadenitis.  Extensive surrounding inflammatory change appears grossly unchanged compared to prior study.  No abscess collection is seen. No radiopaque calculi or ductal dilatation.  Extensive right-sided cervical adenopathy which has increased from the prior exam. Considerations include a viral etiology or lymphoproliferative disorder."  Diagnosed with Sialadenitis.  Prescribed unasyn 3 grams, solu-medrol 125 mg, zofran 4 mg, pepcid 20 mg, tylenol 650 mg and sodium chloride 1 liter x one.  Already evaluated by ENT team in the ED, per reports (awaiting consult note).

## 2017-07-26 NOTE — CONSULT NOTE ADULT - SUBJECTIVE AND OBJECTIVE BOX
HPI:  27F w/ h/o asthma presenting with worsening R neck swelling. As noted in note from prior admission, patient has had right neck swelling for over a year now and was recently seen by Dr. Castillo in the clinic for further evaluation. She had a CT neck 4/2 which showed lymphadenopathy R>L and had an FNA 5/5 which showed reactive lymphadenopathy but no malignancy. She was also treated with Augmentin a couple months ago without effect. More recently on 7/16 she came to the ED for worsening R neck swelling and was discharged home on PO clindamycin after CT scan showed evidence of sialadenitis, with directions for sialoadenitis treatment (gland massages, warm compresses, sialogogues). Patient says she never felt better since discharged and is now presenting with worsening R neck swelling involving not only the right lateral neck but also the central and L neck, as well as likely allergic reaction to the clindamycin. Denies SOB, dysphagia. Complains of odynophagia from the pain. Has not noted any other lumps/masses. Afebrile. WBC stable and low at 6. Extensive infectious w/u performed last time was negative. Repeat CT today with findings as noted below.        PAST MEDICAL & SURGICAL HISTORY:  Lymphadenopathy  Asthma  S/P fine needle aspiration    MEDICATIONS  (STANDING):  sodium chloride 0.9%. 1000 milliLiter(s) (100 mL/Hr) IV Continuous <Continuous>  ampicillin/sulbactam  IVPB 1.5 Gram(s) IV Intermittent every 6 hours  methylPREDNISolone sodium succinate Injectable 40 milliGRAM(s) IV Push every 8 hours  famotidine    Tablet 20 milliGRAM(s) Oral two times a day    MEDICATIONS  (PRN):  ondansetron Injectable 4 milliGRAM(s) IV Push every 8 hours PRN Nausea and/or Vomiting    Allergies  azithromycin (Rash)  clindamycin (Pruritus; Rash)    REVIEW OF SYSTEMS:  Not relevant except for HPI    Vital Signs Last 24 Hrs  T(C): 36.9 (26 Jul 2017 22:38), Max: 37.5 (26 Jul 2017 13:34)  T(F): 98.5 (26 Jul 2017 22:38), Max: 99.5 (26 Jul 2017 13:34)  HR: 71 (26 Jul 2017 22:38) (71 - 100)  BP: 126/79 (26 Jul 2017 22:38) (126/79 - 140/69)  BP(mean): --  RR: 17 (26 Jul 2017 22:38) (16 - 17)  SpO2: 99% (26 Jul 2017 22:38) (99% - 100%)    NAD, alert, awake  Ears: pinnae wnl  Nose: no deformities, mucosa moist and pink  OC/OP: tongue midline, FOM soft and flat, no purulence expressed from Gildardo or Warton's duct, tonsils +1  Neck: soft, non-fluctuant right neck swelling extending over the submandibular region, tender on palpation. No palpable discrete right or left LAD. Thyroid non-enlarged.  Breathing comfortably on RA, no stridor or stertor    CT neck 7/26  Redemonstration of acute right submandibular gland sialoadenitis. Extensive  surrounding inflammatory change appears grossly unchanged compared to prior  study.  No abscess collection is seen. No radiopaque calculi or ductal dilatation.    Extensive right-sided cervical adenopathy which has increased from the prior  exam. Considerations include a viral etiology or lymphoproliferative  disorder.    A/P  27F w/ right neck mass for over a year which has not been improving on multiple trials of PO abx concerning for atypical infection vs malignancy.  -no acute ENT intervention  -unasyn  -continue sialoadenitis management: massages, warm compresses, sialogogues   -admit to medicine for IV abx  -call/page with questions

## 2017-07-26 NOTE — ED PROVIDER NOTE - OBJECTIVE STATEMENT
28 yo F w/ hx of Asthma and lymphadenopathy presents with allergic reaction since last week. Pt was started on Clindamycin for sialadenitis last week Tuesday, has since noticed the hives all over her body, pt reports pruritis but denies any stridor or throat closing feeling, however, Pt felt the need to use her albuterol pump, which she has not used in years. Pt took PO benadryl last night and IM benadryl at work this morning. Pt discontinued the Clindamycin on Monday, with 2 pills left to take. Pt also reports worsening of her sialadenitis, swelling is getting worse, difficulty swallowing, chills, nausea but no vomiting.

## 2017-07-26 NOTE — H&P ADULT - LYMPHATICS COMMENTS
tenderness over R-mandibular/submandibular areas - unable to distinguish discrete nodes because of generalized swelling

## 2017-07-26 NOTE — H&P ADULT - PROBLEM SELECTOR PLAN 5
- moderate leukocyte esterase, proteinuria = 30  - asymptomatic  - patient worried about possibility of yeast infection since taking antibiotic repeatedly  - follow for any signs/symptoms - no issues presently  - no recent exacerbations  - has albuterol HFA and nebulizer at home (prescribed in-House)  - follow pulse-ox as necessary

## 2017-07-26 NOTE — ED PROVIDER NOTE - ATTENDING CONTRIBUTION TO CARE
I performed a face to face evaluation of this patient and performed a full history and physical examination on the patient.  I agree with the resident's history, physical examination, and plan of the patient. 26 y/o female, with hx of lymphadenopathy s/p neg FNA, and  recent salivary infection on clinda c/o rash all over body since starting clinda, also worsening induration/warmth to submandibular region. patent airway, non toxic, not severe sepsis. got benadryl pta. will give pepcid, steroids for allergic reaction, ct IV contrast for neck ?lymphadenopathy/infection to r/o growing abscess. unasyn, ENT consult, dispo pending but likely admission.

## 2017-07-26 NOTE — H&P ADULT - ASSESSMENT
27 year old female, with past history significant for Asthma, Lymphadenopathy, Fine needle aspiration, presented to the ED secondary to generalized rash and right face/neck swelling.  Vital signs upon ED presentation as follows: BP = 140/69, Hr = 100, RR = 16, T = 37.5 C (99.5 F), O2 Sat = 100% on RA.  CT of Neck Soft Tissue w/ IC reflects "Redemonstration of acute right submandibular gland sialoadenitis.  Extensive surrounding inflammatory change appears grossly unchanged compared to prior study.  No abscess collection is seen. No radiopaque calculi or ductal dilatation.  Extensive right-sided cervical adenopathy which has increased from the prior exam. Considerations include a viral etiology or lymphoproliferative disorder."  Diagnosed with Sialedinitis. 27 year old female, with past history significant for Asthma, Lymphadenopathy, Fine needle aspiration, presented to the ED secondary to generalized rash and right face/neck swelling.  Vital signs upon ED presentation as follows: BP = 140/69, Hr = 100, RR = 16, T = 37.5 C (99.5 F), O2 Sat = 100% on RA.  CT of Neck Soft Tissue w/ IC reflects "Redemonstration of acute right submandibular gland sialoadenitis.  Extensive surrounding inflammatory change appears grossly unchanged compared to prior study.  No abscess collection is seen. No radiopaque calculi or ductal dilatation.  Extensive right-sided cervical adenopathy which has increased from the prior exam. Considerations include a viral etiology or lymphoproliferative disorder."  Diagnosed with Sialadenitis.  Admitted for further management of Sialadenitis, Lymphadenopathy, Prediabetes, Abnormal urine findings, Asthma (uncomplicated, asymptomatic)...

## 2017-07-26 NOTE — ED ADULT TRIAGE NOTE - CHIEF COMPLAINT QUOTE
c/o generalized body redness, hives, itching, since last week with worsening on Monday, patient states symptoms started after being placed on Clindamycin for salivatory gland infection. PMH: asthma, lymphadenopathy

## 2017-07-26 NOTE — H&P ADULT - FAMILY HISTORY
Mother  Still living? Unknown  Family history of peripheral arterial disease, Age at diagnosis: Age Unknown  Family history of hypertension, Age at diagnosis: Age Unknown     Aunt  Still living? Unknown  Family history of breast cancer, Age at diagnosis: Age Unknown  Family history of diabetes mellitus, Age at diagnosis: Age Unknown     Father  Still living? Unknown  Family history of hypertension, Age at diagnosis: Age Unknown     Grandparent  Still living? Unknown  Family history of hypertension, Age at diagnosis: Age Unknown  Family history of diabetes mellitus, Age at diagnosis: Age Unknown     Grandparent  Still living? Unknown  Family history of hypertension, Age at diagnosis: Age Unknown

## 2017-07-26 NOTE — H&P ADULT - MUSCULOSKELETAL
details… detailed exam no joint erythema/normal strength/no calf tenderness/ROM intact/no joint swelling/no joint warmth

## 2017-07-26 NOTE — H&P ADULT - PROBLEM SELECTOR PLAN 2
- CT scan of 07/16/2017 with several enlarged lymph nodes  - current CT demonstrated further enlargement of the lymph nodes  - viral studies as above - CT scan of 07/16/2017 with several enlarged lymph nodes  - current CT demonstrated further enlargement of the lymph nodes  - viral studies as above  - FNA study performed in 05/2017 "normal," per patient  - follow for improvement/resolution on antibiotic, steroid

## 2017-07-26 NOTE — ED ADULT NURSE NOTE - OBJECTIVE STATEMENT
Pt rec'd in intake, c/o papular rash to body s/p taking clindamycin for throat infection. Pt states the rash is pruritic and the throat infection hasn't resolved.

## 2017-07-26 NOTE — ED PROVIDER NOTE - MEDICAL DECISION MAKING DETAILS
28 yo F w/ hx of Asthma and lymphadenopathy presents with allergic reaction possibly to Clindamycin, and worsening sialadenitis  - Labs, Pepcid, Zofran, Tylenol, IVF, solumedrol

## 2017-07-26 NOTE — H&P ADULT - NSHPSOCIALHISTORY_GEN_ALL_CORE
SOCIAL HISTORY:  - works at a physician's office  - no history of smoking  - no history of alcohol abuse  - no history of illegal drug use

## 2017-07-26 NOTE — H&P ADULT - ALLERGY TYPES
reactions to food/reactions to medicines/Clindamycin (rash), ?Azithromycin (unsure), Seafood (respiratory arrest)

## 2017-07-26 NOTE — H&P ADULT - PROBLEM SELECTOR PLAN 1
- recurrent since May 2017 after FNA of lymph node, but cyst-like mass first noted submandibular area in 01/2017  - demonstrated on CT scan of soft tissues of the neck  - study for Fiorella-Barr nuclear antigen and IgG positive, CMV PCR negative, Hepatitis C virus RNA by PCR negative, Toxoplasma DNA negative, HIV negative  - not resolved with augmentin therapy twice  - allergic reaction to recent clindamycin therapy  - now started on unasyn 3 grams; continued 1.5 grams Q6H  - also prescribed solu-medrol, pepcid, zofran, sodium chloride  - continued on solu-medrol 40 mg Q8H x 3 doses (to be re-evaluated), pepcid daily, zofran and NaCl 100 mL/Hr x 24 hours  - already seen by ENT team per reports (appreciated); consult note in progress

## 2017-07-26 NOTE — H&P ADULT - PROBLEM SELECTOR PLAN 3
- no issues presently  - no recent exacerbations  - has albuterol HFA and nebulizer at home (prescribed in-House)  - follow pulse-ox as necessary - glucose = 116, hemoglobin A1c = 5.8 (07/16/2017)  - nutrition counseling  - consistent carb diet

## 2017-07-26 NOTE — H&P ADULT - NSHPLABSRESULTS_GEN_ALL_CORE
12.6   6.23  )-----------( 212      ( 26 Jul 2017 15:05 )             38.9       07-26    136  |  99  |  8   ----------------------------<  116<H>  4.1   |  22  |  0.99    Ca    9.0      26 Jul 2017 15:05  Mg     1.8     07-26    TPro  7.5  /  Alb  4.0  /  TBili  0.3  /  DBili  x   /  AST  21  /  ALT  14  /  AlkPhos  84  07-26        CT NECK SOFT TISSUE IC  -  Jul 16 2017     INTERPRETATION:  HISTORY: Right-sided neck swelling     Description: A contrast-enhanced neck CT was performed.    COMPARISON: None    TECHNIQUE:    90 cc intravenous Omnipaque 350 contrast was administered, 10 cc contrast   was discarded.    Axial thin section images with coronal and sagittal reformatted series   were performed.    FINDINGS:    Dental amalgam streak artifact limits evaluation of the upper neck soft   tissues.    The right submandibular gland is asymmetrically enlarged compared to the   contralateral side, and surrounding extensive edema and soft tissue   swelling is present. Thickening of the overlying platysma muscles   present, and infiltration of the adjacent skin and subcutaneous soft   tissues is noted. No radiopaque calculi or Boston's duct dilatation is   visualized. There is no associated abscess at this time.    Enlarged bilateral level 1A lymph nodes are present measuring up to 1.7   cm x 1.3 cm on the right. Enlarged right level 1B lymph nodes measure up   to 2.1 cm x 1.7 cm. Enlarged right level 2 lymph nodes measure up to 2.6   cm x 1.9 cm. Enlarged level 3 lymph nodes measure up to 2 cm x 1.3 cm.   Enlarged right level 4 lymph nodes measure up to 2 cm x 1.6 cm.    No necrotic lymph nodes are present.    There is no evidence for neck abscess.    The remainder of the salivary glands aside from the right submandibular   gland appear unremarkable.    Mild/moderate symmetric enlargement of the palatine tonsils is noted.    The larynx appears unremarkable.     The thyroid gland is without focal lesion.    The paranasal sinuses and mastoid air cells are unremarkable.    No focal intracranial abnormalities are noted within the field-of-view.    The upper lungs are clear.    IMPRESSION:    The exam findings are most consistent with an acute right submandibular   gland sialadenitis. There is no associated abscess collection at this   time. No radiopaque calculi or ductal dilatation is visualized.    Multiple enlarged nonspecific cervical lymph nodes are present, most   likely reactive or infectious in etiology, given the right-sided   sialadenitis. Serial clinical and imaging follow-up over time is   recommended to exclude possibility of neoplasia.

## 2017-07-26 NOTE — ED ADULT NURSE REASSESSMENT NOTE - NS ED NURSE REASSESS COMMENT FT1
No acute distress at present. Pt. is admitted to medicine bed 823A. Report given to RON Frances. Pt. is awaiting transport.

## 2017-07-26 NOTE — H&P ADULT - NECK DETAILS
swelling of right mandibular area/neck and submandibular area with firmness and some tenderness/supple

## 2017-07-26 NOTE — H&P ADULT - PROBLEM SELECTOR PLAN 4
- glucose = 116, hemoglobin A1c = 5.8 (07/16/2017)  - nutrition counseling  - consistent carb diet - moderate leukocyte esterase, proteinuria = 30  - asymptomatic  - patient worried about possibility of yeast infection since taking antibiotic repeatedly  - follow for any signs/symptoms

## 2017-07-27 ENCOUNTER — TRANSCRIPTION ENCOUNTER (OUTPATIENT)
Age: 27
End: 2017-07-27

## 2017-07-27 ENCOUNTER — RESULT REVIEW (OUTPATIENT)
Age: 27
End: 2017-07-27

## 2017-07-27 LAB
BASOPHILS # BLD AUTO: 0.02 K/UL — SIGNIFICANT CHANGE UP (ref 0–0.2)
BASOPHILS NFR BLD AUTO: 0.1 % — SIGNIFICANT CHANGE UP (ref 0–2)
BASOPHILS NFR SPEC: 0 % — SIGNIFICANT CHANGE UP (ref 0–2)
BUN SERPL-MCNC: 8 MG/DL — SIGNIFICANT CHANGE UP (ref 7–23)
CALCIUM SERPL-MCNC: 9 MG/DL — SIGNIFICANT CHANGE UP (ref 8.4–10.5)
CHLORIDE SERPL-SCNC: 103 MMOL/L — SIGNIFICANT CHANGE UP (ref 98–107)
CO2 SERPL-SCNC: 22 MMOL/L — SIGNIFICANT CHANGE UP (ref 22–31)
CREAT SERPL-MCNC: 0.8 MG/DL — SIGNIFICANT CHANGE UP (ref 0.5–1.3)
EOSINOPHIL # BLD AUTO: 0 K/UL — SIGNIFICANT CHANGE UP (ref 0–0.5)
EOSINOPHIL NFR BLD AUTO: 0 % — SIGNIFICANT CHANGE UP (ref 0–6)
EOSINOPHIL NFR FLD: 0 % — SIGNIFICANT CHANGE UP (ref 0–6)
GIANT PLATELETS BLD QL SMEAR: PRESENT — SIGNIFICANT CHANGE UP
GLUCOSE SERPL-MCNC: 120 MG/DL — HIGH (ref 70–99)
GRAM STN WND: SIGNIFICANT CHANGE UP
HCT VFR BLD CALC: 38.1 % — SIGNIFICANT CHANGE UP (ref 34.5–45)
HGB BLD-MCNC: 12.3 G/DL — SIGNIFICANT CHANGE UP (ref 11.5–15.5)
IMM GRANULOCYTES # BLD AUTO: 0.07 # — SIGNIFICANT CHANGE UP
IMM GRANULOCYTES NFR BLD AUTO: 0.5 % — SIGNIFICANT CHANGE UP (ref 0–1.5)
LYMPHOCYTES # BLD AUTO: 0.83 K/UL — LOW (ref 1–3.3)
LYMPHOCYTES # BLD AUTO: 5.9 % — LOW (ref 13–44)
LYMPHOCYTES NFR SPEC AUTO: 3.5 % — LOW (ref 13–44)
MAGNESIUM SERPL-MCNC: 2.2 MG/DL — SIGNIFICANT CHANGE UP (ref 1.6–2.6)
MCHC RBC-ENTMCNC: 28.1 PG — SIGNIFICANT CHANGE UP (ref 27–34)
MCHC RBC-ENTMCNC: 32.3 % — SIGNIFICANT CHANGE UP (ref 32–36)
MCV RBC AUTO: 87 FL — SIGNIFICANT CHANGE UP (ref 80–100)
MONOCYTES # BLD AUTO: 0.83 K/UL — SIGNIFICANT CHANGE UP (ref 0–0.9)
MONOCYTES NFR BLD AUTO: 5.9 % — SIGNIFICANT CHANGE UP (ref 2–14)
MONOCYTES NFR BLD: 1.8 % — LOW (ref 2–9)
MORPHOLOGY BLD-IMP: NORMAL — SIGNIFICANT CHANGE UP
NEUTROPHIL AB SER-ACNC: 94.7 % — HIGH (ref 43–77)
NEUTROPHILS # BLD AUTO: 12.3 K/UL — HIGH (ref 1.8–7.4)
NEUTROPHILS NFR BLD AUTO: 87.6 % — HIGH (ref 43–77)
NRBC # FLD: 0 — SIGNIFICANT CHANGE UP
PHOSPHATE SERPL-MCNC: 2.1 MG/DL — LOW (ref 2.5–4.5)
PLATELET # BLD AUTO: 209 K/UL — SIGNIFICANT CHANGE UP (ref 150–400)
PLATELET COUNT - ESTIMATE: NORMAL — SIGNIFICANT CHANGE UP
PMV BLD: 10.6 FL — SIGNIFICANT CHANGE UP (ref 7–13)
POTASSIUM SERPL-MCNC: 4.4 MMOL/L — SIGNIFICANT CHANGE UP (ref 3.5–5.3)
POTASSIUM SERPL-SCNC: 4.4 MMOL/L — SIGNIFICANT CHANGE UP (ref 3.5–5.3)
RBC # BLD: 4.38 M/UL — SIGNIFICANT CHANGE UP (ref 3.8–5.2)
RBC # FLD: 13.4 % — SIGNIFICANT CHANGE UP (ref 10.3–14.5)
SODIUM SERPL-SCNC: 137 MMOL/L — SIGNIFICANT CHANGE UP (ref 135–145)
SPECIMEN SOURCE: SIGNIFICANT CHANGE UP
TSH SERPL-MCNC: 0.97 UIU/ML — SIGNIFICANT CHANGE UP (ref 0.27–4.2)
WBC # BLD: 14.05 K/UL — HIGH (ref 3.8–10.5)
WBC # FLD AUTO: 14.05 K/UL — HIGH (ref 3.8–10.5)

## 2017-07-27 PROCEDURE — 88342 IMHCHEM/IMCYTCHM 1ST ANTB: CPT | Mod: 26,59

## 2017-07-27 PROCEDURE — 88189 FLOWCYTOMETRY/READ 16 & >: CPT

## 2017-07-27 PROCEDURE — 88360 TUMOR IMMUNOHISTOCHEM/MANUAL: CPT | Mod: 26

## 2017-07-27 PROCEDURE — 88365 INSITU HYBRIDIZATION (FISH): CPT | Mod: 26,59

## 2017-07-27 PROCEDURE — 88305 TISSUE EXAM BY PATHOLOGIST: CPT | Mod: 26

## 2017-07-27 PROCEDURE — 88312 SPECIAL STAINS GROUP 1: CPT | Mod: 26

## 2017-07-27 PROCEDURE — 71020: CPT | Mod: 26

## 2017-07-27 PROCEDURE — 76942 ECHO GUIDE FOR BIOPSY: CPT | Mod: 26

## 2017-07-27 PROCEDURE — 88367 INSITU HYBRIDIZATION AUTO: CPT | Mod: 26

## 2017-07-27 PROCEDURE — 88341 IMHCHEM/IMCYTCHM EA ADD ANTB: CPT | Mod: 26,59

## 2017-07-27 PROCEDURE — 38505 NEEDLE BIOPSY LYMPH NODES: CPT | Mod: RT

## 2017-07-27 PROCEDURE — 99233 SBSQ HOSP IP/OBS HIGH 50: CPT

## 2017-07-27 RX ORDER — SODIUM,POTASSIUM PHOSPHATES 278-250MG
1 POWDER IN PACKET (EA) ORAL
Qty: 0 | Refills: 0 | Status: COMPLETED | OUTPATIENT
Start: 2017-07-27 | End: 2017-07-30

## 2017-07-27 RX ORDER — HYDROCORTISONE 1 %
1 OINTMENT (GRAM) TOPICAL
Qty: 0 | Refills: 0 | Status: DISCONTINUED | OUTPATIENT
Start: 2017-07-27 | End: 2017-07-28

## 2017-07-27 RX ORDER — DIPHENHYDRAMINE HCL 50 MG
25 CAPSULE ORAL EVERY 6 HOURS
Qty: 0 | Refills: 0 | Status: DISCONTINUED | OUTPATIENT
Start: 2017-07-27 | End: 2017-07-28

## 2017-07-27 RX ADMIN — Medication 40 MILLIGRAM(S): at 06:54

## 2017-07-27 RX ADMIN — AMPICILLIN SODIUM AND SULBACTAM SODIUM 100 GRAM(S): 250; 125 INJECTION, POWDER, FOR SUSPENSION INTRAMUSCULAR; INTRAVENOUS at 12:22

## 2017-07-27 RX ADMIN — AMPICILLIN SODIUM AND SULBACTAM SODIUM 100 GRAM(S): 250; 125 INJECTION, POWDER, FOR SUSPENSION INTRAMUSCULAR; INTRAVENOUS at 17:46

## 2017-07-27 RX ADMIN — Medication 1 TABLET(S): at 12:22

## 2017-07-27 RX ADMIN — Medication 1 TABLET(S): at 22:04

## 2017-07-27 RX ADMIN — Medication 1 APPLICATION(S): at 19:37

## 2017-07-27 RX ADMIN — AMPICILLIN SODIUM AND SULBACTAM SODIUM 100 GRAM(S): 250; 125 INJECTION, POWDER, FOR SUSPENSION INTRAMUSCULAR; INTRAVENOUS at 01:27

## 2017-07-27 RX ADMIN — SODIUM CHLORIDE 100 MILLILITER(S): 9 INJECTION INTRAMUSCULAR; INTRAVENOUS; SUBCUTANEOUS at 08:30

## 2017-07-27 RX ADMIN — Medication 25 MILLIGRAM(S): at 19:36

## 2017-07-27 RX ADMIN — FAMOTIDINE 20 MILLIGRAM(S): 10 INJECTION INTRAVENOUS at 06:54

## 2017-07-27 RX ADMIN — Medication 1 TABLET(S): at 17:46

## 2017-07-27 RX ADMIN — SODIUM CHLORIDE 100 MILLILITER(S): 9 INJECTION INTRAMUSCULAR; INTRAVENOUS; SUBCUTANEOUS at 01:27

## 2017-07-27 RX ADMIN — FAMOTIDINE 20 MILLIGRAM(S): 10 INJECTION INTRAVENOUS at 17:46

## 2017-07-27 RX ADMIN — AMPICILLIN SODIUM AND SULBACTAM SODIUM 100 GRAM(S): 250; 125 INJECTION, POWDER, FOR SUSPENSION INTRAMUSCULAR; INTRAVENOUS at 06:53

## 2017-07-27 RX ADMIN — Medication 40 MILLIGRAM(S): at 13:45

## 2017-07-27 RX ADMIN — Medication 40 MILLIGRAM(S): at 22:04

## 2017-07-27 NOTE — PROGRESS NOTE ADULT - SUBJECTIVE AND OBJECTIVE BOX
Patient is a 27y old  Female who presents with a chief complaint of c/o pain , swelling in throat. (2017 00:28)      SUBJECTIVE / OVERNIGHT EVENTS:Pt feels much better today, able to tolerate PO, swelling/Pain  decreasing.C/o itchy rash on arms/chest after getting the clindamycin    MEDICATIONS  (STANDING):  sodium chloride 0.9%. 1000 milliLiter(s) (100 mL/Hr) IV Continuous <Continuous>  ampicillin/sulbactam  IVPB 1.5 Gram(s) IV Intermittent every 6 hours  methylPREDNISolone sodium succinate Injectable 40 milliGRAM(s) IV Push every 8 hours  famotidine    Tablet 20 milliGRAM(s) Oral two times a day  lactobacillus acidophilus 1 Tablet(s) Oral daily  potassium acid phosphate/sodium acid phosphate tablet (K-PHOS No. 2) 1 Tablet(s) Oral four times a day with meals  hydrocortisone 0.5% Ointment 1 Application(s) Topical two times a day    MEDICATIONS  (PRN):  ondansetron Injectable 4 milliGRAM(s) IV Push every 8 hours PRN Nausea and/or Vomiting  ALBUTerol    90 MICROgram(s) HFA Inhaler 2 Puff(s) Inhalation every 4 hours PRN Shortness of Breath and/or Wheezing  ALBUTerol/ipratropium for Nebulization 3 milliLiter(s) Nebulizer every 6 hours PRN Shortness of Breath and/or Wheezing  acetaminophen   Tablet. 650 milliGRAM(s) Oral every 6 hours PRN Mild Pain (1 - 3)  diphenhydrAMINE   Capsule 25 milliGRAM(s) Oral every 6 hours PRN Rash and/or Itching        CAPILLARY BLOOD GLUCOSE        I&O's Summary      PHYSICAL EXAM:  GENERAL: NAD, well-developed  HEAD:  Atraumatic, Normocephalic  EYES: EOMI, PERRLA, conjunctiva and sclera clear  NECK: Supple, No JVD  CHEST/LUNG: Clear to auscultation bilaterally; No wheeze  HEART: Regular rate and rhythm; No murmurs, rubs, or gallops  ABDOMEN: Soft, Nontender, Nondistended; Bowel sounds present  EXTREMITIES:  2+ Peripheral Pulses, No clubbing, cyanosis, or edema  PSYCH: AAOx3  NEUROLOGY: non-focal  SKIN: No rashes or lesions    LABS:                        12.3   14.05 )-----------( 209      ( 2017 07:30 )             38.1         137  |  103  |  8   ----------------------------<  120<H>  4.4   |  22  |  0.80    Ca    9.0      2017 07:30  Phos  2.1       Mg     2.2         TPro  7.5  /  Alb  4.0  /  TBili  0.3  /  DBili  x   /  AST  21  /  ALT  14  /  AlkPhos  84  07-26    PT/INR - ( 2017 16:03 )   PT: 12.2 SEC;   INR: 1.09          PTT - ( 2017 16:03 )  PTT:29.6 SEC      Urinalysis Basic - ( 2017 16:03 )    Color: YELLOW / Appearance: CLEAR / S.028 / pH: 5.5  Gluc: NEGATIVE / Ketone: NEGATIVE  / Bili: NEGATIVE / Urobili: NORMAL E.U.   Blood: NEGATIVE / Protein: 30 / Nitrite: NEGATIVE   Leuk Esterase: MODERATE / RBC: 2-5 / WBC 10-25   Sq Epi: OCC / Non Sq Epi: x / Bacteria: FEW        RADIOLOGY & ADDITIONAL TESTS:    Imaging Personally Reviewed:CT of Neck Soft Tissue w/ IC reflects "Redemonstration of acute right submandibular gland sialoadenitis.  Extensive surrounding inflammatory change appears grossly unchanged compared to prior study.  No abscess collection is seen. No radiopaque calculi or ductal dilatation.  Extensive right-sided cervical adenopathy which has increased from the prior exam. Considerations include a viral etiology or lymphoproliferative disorder."  Diagnosed with Sialadenitis.      Consultant(s) Notes Reviewed:      Care Discussed with Consultants/Other Providers:ENT

## 2017-07-27 NOTE — CHART NOTE - NSCHARTNOTEFT_GEN_A_CORE
IR to do core biopsy of submental or submandibular node tomorrow. ENT called and made aware of IR only sample 1 node. awaiting ENT callback and confirmation if okay.

## 2017-07-27 NOTE — CHART NOTE - NSCHARTNOTEFT_GEN_A_CORE
IR consulted for Core biopsy of submental and submandibular nodes per ENT request. Spoke with Resident Cathy. Awaiting callback for acceptance of consult

## 2017-07-27 NOTE — PROGRESS NOTE ADULT - PROBLEM SELECTOR PLAN 1
Recurrent since May 2017after FNA of lymph node, but cyst-like mass first noted submandibular area in 01/2017. Reviewed  CT scan of soft tissues of the neck  - study for Fiorella-Barr nuclear antigen and IgG positive, CMV PCR negative, Hepatitis C virus RNA by PCR negative, Toxoplasma DNA negative, HIV negative  Not resolved with augmentin therapy twice, allergic reaction to recent clindamycin therapy- now started on unasyn 3 grams; continued 1.5 grams Q6H  On solu-medrol 40 mg Q8H x 3 doses  pepcid daily, zofran   SHALINI  ENT - recommend core biopsy by IRAshvinIR consulted

## 2017-07-27 NOTE — DISCHARGE NOTE ADULT - PROVIDER TOKENS
TOKCELI:'[id number]' TOKEN:'[id number]',TOKEN:'[id number]' TOKEN:'[id number]',TOKEN:'[id number]',TOKEN:'[id number]'

## 2017-07-27 NOTE — DISCHARGE NOTE ADULT - CARE PLAN
Principal Discharge DX:	Sialadenitis  Goal:	Remain stable, resolution  Instructions for follow-up, activity and diet:	follow up with your Primary care doctor within 1 week of discharge, call to schedule an appointment  follow up with ENT in 7-10 days for biopsy results, call to schedule an appointment  Secondary Diagnosis:	Drug exanthem  Instructions for follow-up, activity and diet:	complete Prednisone as prescribed  follow up with Dermatology  Secondary Diagnosis:	Asthma  Goal:	without difficulty breathing, no shortness of breath Principal Discharge DX:	Sialadenitis  Goal:	Remain stable, resolution  Instructions for follow-up, activity and diet:	follow up with your Primary care doctor within 1 week of discharge, call to schedule an appointment  follow up with ENT in 7-10 days for biopsy results, call to schedule an appointment  Secondary Diagnosis:	Drug exanthem  Instructions for follow-up, activity and diet:	complete Prednisone as prescribed  follow up with Dermatology in 1 week, call to schedule an appointment  Secondary Diagnosis:	Asthma  Goal:	without difficulty breathing, no shortness of breath

## 2017-07-27 NOTE — PROVIDER CONTACT NOTE (OTHER) - ASSESSMENT
Rash noted on imnner later thighs bilateral, red. Neck-bumps. Abdomen and back no discoloration or bumps. Right arm slight hyperpigmentation. Left arm now red elevated patches, close to shoulder

## 2017-07-27 NOTE — DISCHARGE NOTE ADULT - MEDICATION SUMMARY - MEDICATIONS TO TAKE
I will START or STAY ON the medications listed below when I get home from the hospital:    predniSONE 10 mg oral tablet  -- 4 tab(s) by mouth once a day x 1 day then   3 tabs Po qd x 1 day then  2 tabs Po qd x 1 day then  1 tab Po qd x 1 day then STOP    -- It is very important that you take or use this exactly as directed.  Do not skip doses or discontinue unless directed by your doctor.  Obtain medical advice before taking any non-prescription drugs as some may affect the action of this medication.  Take with food or milk.    -- Indication: For Allergic reaction    diphenhydrAMINE 50 mg oral capsule  -- 1 cap(s) by mouth every 4 hours, As needed, Rash and/or Itching  -- Indication: For itching    ipratropium-albuterol 0.5 mg-2.5 mg/3 mLinhalation solution  --  inhaled every 6 hours, As Needed  -- Indication: For Asthma    albuterol 90 mcg/inh inhalation aerosol  --  inhaled every 6 hours, As Needed  -- Indication: For Asthma    triamcinolone 0.1% topical ointment  -- 1 application on skin 2 times a day, As needed, itching  -- Indication: For rash    lactobacillus acidophilus oral capsule  -- 1 cap(s) by mouth once a day  -- Indication: For Probiotics

## 2017-07-27 NOTE — DISCHARGE NOTE ADULT - PLAN OF CARE
Remain stable, resolution follow up with your Primary care doctor within 1 week of discharge, call to schedule an appointment  follow up with ENT in 7-10 days for biopsy results, call to schedule an appointment complete Prednisone as prescribed  follow up with Dermatology without difficulty breathing, no shortness of breath complete Prednisone as prescribed  follow up with Dermatology in 1 week, call to schedule an appointment

## 2017-07-27 NOTE — PROGRESS NOTE ADULT - PROBLEM SELECTOR PLAN 2
Await IR guided biospy  FNA study performed in 05/2017 - reactive   - follow for improvement/resolution on antibiotic, steroid

## 2017-07-27 NOTE — DISCHARGE NOTE ADULT - HOSPITAL COURSE
27 year old female, with past history significant for Asthma, right neck mass present over a year with multiple treatment with abx for infection, concerning for atypical infection vs malignancy. Pt presented to the ED secondary to generalized rash and right face/neck swelling recent treatment as outpt for sialadenitis, pt was on clindamycin, rash likely drug related allergy, clindamycin d/c'ed. Dermatology, ENT, ID consulted. CT neck-acute right submandibular gland sialoadenitis, Right sided cervical adenopathy. ENT recommend abx treatment, as well as core biopsy of submental/submandibular nodes. IR was consulted for bx, pt s/p bx 7/27. ID recommend to monitor pt off antibiotic, pt is without fever, no leukocytosis.  Derm eval, recommended  triamcinolone ointment  BID x 2 weeks. Pt also treated with solumedrol, changed to prednisone tamper for home. 27 year old female, with past history significant for Asthma, right neck mass present over a year with multiple treatment with abx for infection, concerning for atypical infection vs malignancy. Pt presented to the ED secondary to generalized rash and right face/neck swelling recent treatment as outpt for sialadenitis, pt was on clindamycin, rash likely drug related allergy, clindamycin d/c'ed. Dermatology, ENT, ID consulted. CT neck-acute right submandibular gland sialoadenitis, Right sided cervical adenopathy. ENT recommend abx treatment, as well as core biopsy of submental/submandibular nodes. IR was consulted for bx, pt s/p bx 7/27. ID recommend to monitor pt off antibiotic, pt is without fever, no leukocytosis.  Derm eval, recommended  triamcinolone ointment  BID x 2 weeks. Pt also treated with solumedrol, changed to prednisone tamper for home.  Pt is medically stable for discharge, Pt to f/u outpt with her PCP,  ENT, Dermatology. 27 year old female, with past history significant for Asthma, right neck mass present over a year with multiple treatment with abx for infection, concerning for atypical infection vs malignancy. Pt presented to the ED secondary to generalized rash and right face/neck swelling recent treatment as outpt for sialadenitis, pt was on clindamycin, rash likely drug related allergy, clindamycin d/c'ed. Dermatology, ENT, ID consulted. CT neck-acute right submandibular gland sialoadenitis, Right sided cervical adenopathy. ENT recommend abx treatment, as well as core biopsy of submental/submandibular nodes. IR was consulted for bx, pt s/p bx 7/27. ID recommend to monitor pt off antibiotic, pt is without fever, no leukocytosis.  Derm eval, recommended  triamcinolone ointment  BID x 2 weeks. Pt also treated with solumedrol, changed to prednisone tamper for home.  Pt is medically stable for discharge, Pt to f/u outpt with her PCP,  ENT, Dermatology.   PCP f/u as out patient

## 2017-07-27 NOTE — DISCHARGE NOTE ADULT - CARE PROVIDERS DIRECT ADDRESSES
,[email or web address]

## 2017-07-27 NOTE — DISCHARGE NOTE ADULT - PATIENT PORTAL LINK FT
“You can access the FollowHealth Patient Portal, offered by [facility], by registering with the following website: [email or web address]”

## 2017-07-27 NOTE — PROGRESS NOTE ADULT - SUBJECTIVE AND OBJECTIVE BOX
Pt seen and examined, no acute events overnight, pain controlled, swelling resolving    avss  nad, lying in bed  nonlabored breathing on RA, no stridor  nc clear  oc/op clear, no pus or drainage  neck obese, b/l submental nodes palpated, R smg mild ttp and firm, no fluctuance    27F w/ right neck mass for over a year which has not been improving on multiple trials of PO abx concerning for atypical infection vs malignancy p/w likely acute R SMG sialadenitis  -no acute ENT intervention  -unasyn, can transition to po abx  -continue sialoadenitis management: massages, warm compresses, sialogogues   -will d/w attending  -needs f/u as outpatient with Dr. Castillo upon discharge  -call/page with questions Pt seen and examined, no acute events overnight, pain controlled, swelling resolving    avss  nad, lying in bed  nonlabored breathing on RA, no stridor  nc clear  oc/op clear, no pus or drainage  neck obese, b/l submental nodes palpated, R smg mild ttp and firm, no fluctuance    27F w/ right neck mass for over a year which has not been improving on multiple trials of PO abx concerning for atypical infection vs malignancy p/w likely acute R SMG sialadenitis  -no acute ENT intervention  -abx per primary team  -continue sialoadenitis management: massages, warm compresses, sialogogues  -recommend trying to get core biopsy of submental/submandibular nodes per IR - if can't be done today, does not need to hold up dispo   -seen and d/w attending  -needs f/u as outpatient with Dr. Castillo upon discharge  -call/page with questions

## 2017-07-27 NOTE — DISCHARGE NOTE ADULT - CARE PROVIDER_API CALL
Jef Castillo), Otolaryngology  [address] [place] [address]  Phone: [phone]  Fax: [phone] Jef Castillo), Otolaryngology  [address] [place] [address]  Phone: [phone]  Fax: [phone]    Tala Hernandez (VALENTE), Family Medicine  [address] [place] [address]  Phone: [phone]  Fax: [phone] Jef Castillo), Otolaryngology  [address] [place] [address]  Phone: [phone]  Fax: [phone]    Tala Hernandez (VALENTE), Family Medicine  [address] [place] [address]  Phone: [phone]  Fax: [phone]    Angella Vences), Dermatology; Pediatric Dermatology  [address] [place] [address]  Phone: [phone]  Fax: [phone]

## 2017-07-27 NOTE — PROGRESS NOTE ADULT - PROBLEM SELECTOR PLAN 5
- no issues presently ,- no recent exacerbations  - has albuterol HFA and nebulizer at home (prescribed in-House)  - follow pulse-ox as necessary

## 2017-07-28 DIAGNOSIS — Z29.9 ENCOUNTER FOR PROPHYLACTIC MEASURES, UNSPECIFIED: ICD-10-CM

## 2017-07-28 DIAGNOSIS — L30.9 DERMATITIS, UNSPECIFIED: ICD-10-CM

## 2017-07-28 DIAGNOSIS — L27.0 GENERALIZED SKIN ERUPTION DUE TO DRUGS AND MEDICAMENTS TAKEN INTERNALLY: ICD-10-CM

## 2017-07-28 LAB
BUN SERPL-MCNC: 9 MG/DL — SIGNIFICANT CHANGE UP (ref 7–23)
CALCIUM SERPL-MCNC: 9.1 MG/DL — SIGNIFICANT CHANGE UP (ref 8.4–10.5)
CHLORIDE SERPL-SCNC: 105 MMOL/L — SIGNIFICANT CHANGE UP (ref 98–107)
CO2 SERPL-SCNC: 24 MMOL/L — SIGNIFICANT CHANGE UP (ref 22–31)
CREAT SERPL-MCNC: 0.72 MG/DL — SIGNIFICANT CHANGE UP (ref 0.5–1.3)
GLUCOSE SERPL-MCNC: 140 MG/DL — HIGH (ref 70–99)
HCT VFR BLD CALC: 38.2 % — SIGNIFICANT CHANGE UP (ref 34.5–45)
HGB BLD-MCNC: 12.2 G/DL — SIGNIFICANT CHANGE UP (ref 11.5–15.5)
MCHC RBC-ENTMCNC: 27.8 PG — SIGNIFICANT CHANGE UP (ref 27–34)
MCHC RBC-ENTMCNC: 31.9 % — LOW (ref 32–36)
MCV RBC AUTO: 87 FL — SIGNIFICANT CHANGE UP (ref 80–100)
NRBC # FLD: 0 — SIGNIFICANT CHANGE UP
PLATELET # BLD AUTO: 244 K/UL — SIGNIFICANT CHANGE UP (ref 150–400)
PMV BLD: 11.2 FL — SIGNIFICANT CHANGE UP (ref 7–13)
POTASSIUM SERPL-MCNC: 4.3 MMOL/L — SIGNIFICANT CHANGE UP (ref 3.5–5.3)
POTASSIUM SERPL-SCNC: 4.3 MMOL/L — SIGNIFICANT CHANGE UP (ref 3.5–5.3)
RBC # BLD: 4.39 M/UL — SIGNIFICANT CHANGE UP (ref 3.8–5.2)
RBC # FLD: 13.6 % — SIGNIFICANT CHANGE UP (ref 10.3–14.5)
SODIUM SERPL-SCNC: 141 MMOL/L — SIGNIFICANT CHANGE UP (ref 135–145)
WBC # BLD: 20.59 K/UL — HIGH (ref 3.8–10.5)
WBC # FLD AUTO: 20.59 K/UL — HIGH (ref 3.8–10.5)

## 2017-07-28 PROCEDURE — 99222 1ST HOSP IP/OBS MODERATE 55: CPT | Mod: GC

## 2017-07-28 PROCEDURE — 99233 SBSQ HOSP IP/OBS HIGH 50: CPT

## 2017-07-28 RX ORDER — DIPHENHYDRAMINE HCL 50 MG
50 CAPSULE ORAL EVERY 4 HOURS
Qty: 0 | Refills: 0 | Status: DISCONTINUED | OUTPATIENT
Start: 2017-07-28 | End: 2017-07-30

## 2017-07-28 RX ADMIN — FAMOTIDINE 20 MILLIGRAM(S): 10 INJECTION INTRAVENOUS at 17:34

## 2017-07-28 RX ADMIN — Medication 1 TABLET(S): at 12:25

## 2017-07-28 RX ADMIN — Medication 25 MILLIGRAM(S): at 05:53

## 2017-07-28 RX ADMIN — Medication 1 APPLICATION(S): at 21:54

## 2017-07-28 RX ADMIN — Medication 50 MILLIGRAM(S): at 12:25

## 2017-07-28 RX ADMIN — Medication 40 MILLIGRAM(S): at 12:25

## 2017-07-28 RX ADMIN — FAMOTIDINE 20 MILLIGRAM(S): 10 INJECTION INTRAVENOUS at 05:50

## 2017-07-28 RX ADMIN — AMPICILLIN SODIUM AND SULBACTAM SODIUM 100 GRAM(S): 250; 125 INJECTION, POWDER, FOR SUSPENSION INTRAMUSCULAR; INTRAVENOUS at 05:51

## 2017-07-28 RX ADMIN — Medication 1 TABLET(S): at 08:31

## 2017-07-28 RX ADMIN — AMPICILLIN SODIUM AND SULBACTAM SODIUM 100 GRAM(S): 250; 125 INJECTION, POWDER, FOR SUSPENSION INTRAMUSCULAR; INTRAVENOUS at 00:16

## 2017-07-28 RX ADMIN — Medication 1 APPLICATION(S): at 05:55

## 2017-07-28 RX ADMIN — Medication 1 TABLET(S): at 17:34

## 2017-07-28 RX ADMIN — Medication 1 APPLICATION(S): at 17:34

## 2017-07-28 RX ADMIN — Medication 50 MILLIGRAM(S): at 21:53

## 2017-07-28 RX ADMIN — Medication 1 TABLET(S): at 21:53

## 2017-07-28 NOTE — PROGRESS NOTE ADULT - SUBJECTIVE AND OBJECTIVE BOX
Patient is a 27y old  Female who presents with a chief complaint of c/o pain , swelling in throat. (27 Jul 2017 00:28)      SUBJECTIVE / OVERNIGHT EVENTS:Pt feels much better today, able to tolerate PO, swelling/Pain  decreasing.C/o itchy rash on arms/chest after getting the clindamycin    MEDICATIONS  (STANDING):  sodium chloride 0.9%. 1000 milliLiter(s) (100 mL/Hr) IV Continuous <Continuous>  ampicillin/sulbactam  IVPB 1.5 Gram(s) IV Intermittent every 6 hours  famotidine    Tablet 20 milliGRAM(s) Oral two times a day  lactobacillus acidophilus 1 Tablet(s) Oral daily  potassium acid phosphate/sodium acid phosphate tablet (K-PHOS No. 2) 1 Tablet(s) Oral four times a day with meals  hydrocortisone 0.5% Ointment 1 Application(s) Topical two times a day    MEDICATIONS  (PRN):  ondansetron Injectable 4 milliGRAM(s) IV Push every 8 hours PRN Nausea and/or Vomiting  ALBUTerol    90 MICROgram(s) HFA Inhaler 2 Puff(s) Inhalation every 4 hours PRN Shortness of Breath and/or Wheezing  ALBUTerol/ipratropium for Nebulization 3 milliLiter(s) Nebulizer every 6 hours PRN Shortness of Breath and/or Wheezing  acetaminophen   Tablet. 650 milliGRAM(s) Oral every 6 hours PRN Mild Pain (1 - 3)  diphenhydrAMINE   Capsule 25 milliGRAM(s) Oral every 6 hours PRN Rash and/or Itching    Vital Signs Last 24 Hrs  T(C): 36.9 (28 Jul 2017 05:47), Max: 36.9 (28 Jul 2017 05:47)  T(F): 98.5 (28 Jul 2017 05:47), Max: 98.5 (28 Jul 2017 05:47)  HR: 78 (28 Jul 2017 05:47) (74 - 78)  BP: 122/68 (28 Jul 2017 05:47) (122/68 - 132/79)  BP(mean): --  RR: 18 (28 Jul 2017 05:47) (18 - 18)  SpO2: 99% (28 Jul 2017 05:47) (99% - 99%)      CAPILLARY BLOOD GLUCOSE        I&O's Summary      PHYSICAL EXAM:  GENERAL: NAD, well-developed  HEAD:  presence of submandibular swelling, firm, tender, mobile.  Oral cavity - no signs of oral infection   EYES: EOMI, PERRLA, conjunctiva and sclera clear  NECK: Supple, No JVD  CHEST/LUNG: Clear to auscultation bilaterally; No wheeze  HEART: Regular rate and rhythm; No murmurs, rubs, or gallops  ABDOMEN: Soft, Nontender, Nondistended; Bowel sounds present  EXTREMITIES:  2+ Peripheral Pulses, No clubbing, cyanosis, or edema  PSYCH: AAOx3  NEUROLOGY: non-focal  SKIN: No rashes or lesions    LABS:                             12.2   20.59 )-----------( 244      ( 28 Jul 2017 06:15 )             38.2     07-28    141  |  105  |  9   ----------------------------<  140<H>  4.3   |  24  |  0.72    Ca    9.1      28 Jul 2017 06:15  Phos  2.1     07-27  Mg     2.2     07-27    TPro  7.5  /  Alb  4.0  /  TBili  0.3  /  DBili  x   /  AST  21  /  ALT  14  /  AlkPhos  84  07-26        RADIOLOGY & ADDITIONAL TESTS:    Imaging Personally Reviewed:CT of Neck Soft Tissue w/ IC reflects "Redemonstration of acute right submandibular gland sialoadenitis.  Extensive surrounding inflammatory change appears grossly unchanged compared to prior study.  No abscess collection is seen. No radiopaque calculi or ductal dilatation.  Extensive right-sided cervical adenopathy which has increased from the prior exam. Considerations include a viral etiology or lymphoproliferative disorder."  Diagnosed with Sialadenitis.      Consultant(s) Notes Reviewed:      Care Discussed with Consultants/Other Providers:ENT Patient is a 27y old  Female who presents with a chief complaint of c/o pain , swelling in throat. (27 Jul 2017 00:28)      SUBJECTIVE / OVERNIGHT EVENTS:Pt reports worsening rash on chest/abdomen/thighs .This AM, pt reports throat discomfort this AM  Itching present. pt s/p IR guided Bx yday    MEDICATIONS  (STANDING):  sodium chloride 0.9%. 1000 milliLiter(s) (100 mL/Hr) IV Continuous <Continuous>  ampicillin/sulbactam  IVPB 1.5 Gram(s) IV Intermittent every 6 hours  famotidine    Tablet 20 milliGRAM(s) Oral two times a day  lactobacillus acidophilus 1 Tablet(s) Oral daily  potassium acid phosphate/sodium acid phosphate tablet (K-PHOS No. 2) 1 Tablet(s) Oral four times a day with meals  hydrocortisone 0.5% Ointment 1 Application(s) Topical two times a day    MEDICATIONS  (PRN):  ondansetron Injectable 4 milliGRAM(s) IV Push every 8 hours PRN Nausea and/or Vomiting  ALBUTerol    90 MICROgram(s) HFA Inhaler 2 Puff(s) Inhalation every 4 hours PRN Shortness of Breath and/or Wheezing  ALBUTerol/ipratropium for Nebulization 3 milliLiter(s) Nebulizer every 6 hours PRN Shortness of Breath and/or Wheezing  acetaminophen   Tablet. 650 milliGRAM(s) Oral every 6 hours PRN Mild Pain (1 - 3)  diphenhydrAMINE   Capsule 25 milliGRAM(s) Oral every 6 hours PRN Rash and/or Itching    Vital Signs Last 24 Hrs  T(C): 36.9 (28 Jul 2017 05:47), Max: 36.9 (28 Jul 2017 05:47)  T(F): 98.5 (28 Jul 2017 05:47), Max: 98.5 (28 Jul 2017 05:47)  HR: 78 (28 Jul 2017 05:47) (74 - 78)  BP: 122/68 (28 Jul 2017 05:47) (122/68 - 132/79)  BP(mean): --  RR: 18 (28 Jul 2017 05:47) (18 - 18)  SpO2: 99% (28 Jul 2017 05:47) (99% - 99%)      CAPILLARY BLOOD GLUCOSE        I&O's Summary      PHYSICAL EXAM:  GENERAL: NAD, well-developed  HEAD:  presence of submandibular swelling, firm, tender, mobile.  Oral cavity - no signs of oral infection   EYES: EOMI, PERRLA, conjunctiva and sclera clear  NECK: Supple, No JVD  CHEST/LUNG: Clear to auscultation bilaterally; No wheeze  HEART: Regular rate and rhythm; No murmurs, rubs, or gallops  ABDOMEN: Soft, Nontender, Nondistended; Bowel sounds present  EXTREMITIES:  2+ Peripheral Pulses, No clubbing, cyanosis, or edema  PSYCH: AAOx3  NEUROLOGY: non-focal  SKIN: No rashes or lesions    LABS:                             12.2   20.59 )-----------( 244      ( 28 Jul 2017 06:15 )             38.2     07-28    141  |  105  |  9   ----------------------------<  140<H>  4.3   |  24  |  0.72    Ca    9.1      28 Jul 2017 06:15  Phos  2.1     07-27  Mg     2.2     07-27    TPro  7.5  /  Alb  4.0  /  TBili  0.3  /  DBili  x   /  AST  21  /  ALT  14  /  AlkPhos  84  07-26        RADIOLOGY & ADDITIONAL TESTS:    Imaging Personally Reviewed:CT of Neck Soft Tissue w/ IC reflects "Redemonstration of acute right submandibular gland sialoadenitis.  Extensive surrounding inflammatory change appears grossly unchanged compared to prior study.  No abscess collection is seen. No radiopaque calculi or ductal dilatation.  Extensive right-sided cervical adenopathy which has increased from the prior exam. Considerations include a viral etiology or lymphoproliferative disorder."  Diagnosed with Sialadenitis.      Consultant(s) Notes Reviewed:      Care Discussed with Consultants/Other Providers:ENT

## 2017-07-28 NOTE — DIETITIAN INITIAL EVALUATION ADULT. - OTHER INFO
Patient seen for nutrition consult (HbA1c, BMI>40). Patient admitted for pain, throat swelling. Patient reports starting clindamycin about 1 week ago and having an allergic reaction which made her nauseas and affected her appetite. Patient was mainly consuming soups. Prior to change in appetite, patient was following a regular diet with no restrictions. Patient understands her HbA1c 5.8 (7/16/2017) is elevated (consumed sweetened ice coffee daily). Patient's appetite has improved in house, no reported swallowing difficulty. Patient allergic to seafood. No GI distress (nausea/vomiting/diarrhea/constipation.). Discussed carbohydrate consistent and general healthy eating with patient. Patient amenable to education.

## 2017-07-28 NOTE — PROGRESS NOTE ADULT - PROBLEM SELECTOR PLAN 9
Improve score less than 0.4% risk, encourage ambulation. if pt continues to stay inpt, will consider DVT prox

## 2017-07-28 NOTE — DIETITIAN INITIAL EVALUATION ADULT. - NS AS NUTRI INTERV MEALS SNACK
Continue Consistent Carbohydrate (evening snack) diet which remains appropriate at this time/General/healthful diet

## 2017-07-28 NOTE — CONSULT NOTE ADULT - ASSESSMENT
27F s/p few courses of amoxicillin and augmentin as outpatient for submandibular swelling and reactive lymphadenopathy now admitted with rash

## 2017-07-28 NOTE — CONSULT NOTE ADULT - ASSESSMENT
28 y/o F, with past history significant for Asthma, Lymphadenopathy, Fine needle aspiration, presented to the ED secondary to generalized rash and right face/neck swelling.   Admitted for further management of Sialadenitis, and Lymphadenopathy. 28 y/o F, with past history significant for Asthma, Lymphadenopathy, Fine needle aspiration, presented to the ED secondary to generalized rash and right face/neck swelling. Admitted for further management of Sialadenitis, and Lymphadenopathy.

## 2017-07-28 NOTE — CONSULT NOTE ADULT - SUBJECTIVE AND OBJECTIVE BOX
Consultation Requested by:    Patient is a 27y old  Female who presents with a chief complaint of c/o pain , swelling in throat. (2017 17:46)    HPI:  27 year old female, with past history significant for Asthma, Lymphadenopathy, Fine needle aspiration, presented to the ED secondary to generalized rash and right face/neck swelling.  Seen and evaluated at bedside; NAD.  Patient relates that she was in the ED approximately 1.5 weeks ago and was prescribed clindamycin for infection of the salivary gland.  Notes that from the initial dose of clindamycin minor rashes were present on her thighs, but these were rationalized to be due to heat rash.  However, over time, as patient continued her TID clindamycin, she noted worsening erythematous, itchy rashes, which progressed to envelop the entire body.  Swelling of the jaw worsened and expanded from the right side of the jaw to underneath the jaw and the upper neck.  Had associated pain and difficulty swallowing.  Sometimes at nights, patient felt as if she was choking on her saliva because of inadequate ability to clear the secretions.  Stopped taking Clindamycin on Monday.  Subsequently, patient decided to return to the ED for evaluation/management.  Reports of associated chills and had bout of nausea with vomiting.    As past history of swelling, patient reports noting a palpable cyst-like structure underneath the jaw in 2017.  Subsequently evaluated by ENT and underwent CT scan of the neck, which demonstrated 7 to 8 enlarged lymph nodes.  In May, patient had fine needle aspiration of the cyst and after about 1.5 to 2 weeks post procedure, patient began to suffer with intermittent swelling of the right jaw, with never complete resolution.  In addition to home therapies (massage, warm compresses), patient was treated with Augmentin twice, but the swelling continued to occur.    Vital signs upon ED presentation as follows: BP = 140/69, Hr = 100, RR = 16, T = 37.5 C (99.5 F), O2 Sat = 100% on RA.  CT of Neck Soft Tissue w/ IC reflects "Redemonstration of acute right submandibular gland sialoadenitis.  Extensive surrounding inflammatory change appears grossly unchanged compared to prior study.  No abscess collection is seen. No radiopaque calculi or ductal dilatation.  Extensive right-sided cervical adenopathy which has increased from the prior exam. Considerations include a viral etiology or lymphoproliferative disorder."  Diagnosed with Sialadenitis.  Prescribed unasyn 3 grams, solu-medrol 125 mg, zofran 4 mg, pepcid 20 mg, tylenol 650 mg and sodium chloride 1 liter x one.  Already evaluated by ENT team in the ED, per reports (awaiting consult note). (2017 21:31)      REVIEW OF SYSTEMS  All review of systems negative, except for those marked:  General:                         [] Abnormal:  	                    [] Night Sweats		[] Fever		[] Weight Loss  Pulmonary/Cough:	[] Abnormal:  Cardiac/Chest Pain:	[] Abnormal:  Gastrointestinal:	[] Abnormal:  Eyes:		[] Abnormal:  ENT:		[] Abnormal:  :		[] Abnormal:  Musculoskeletal	[] Abnormal:  Endocrine:		[] Abnormal:  Lymph Nodes:	[] Abnormal:  Neuro:		[] Abnormal:  Skin:		[] Abnormal:  Allergy/Immune:	[] Abnormal:  Psychiatric:	[] Abnormal:  [] All other review of systems negative  [] Unable to obtain (explain):    Recent Ill Contacts:	[] No	[] Yes:  Recent Travel History:	[] No	[] Yes:  Recent Animal/Insect Exposure/Tick Bites:	[] No	[] Yes:    Allergies    azithromycin (Rash)  clindamycin (Pruritus; Rash)  Seafood (Hives; Short breath)    Intolerances      Antimicrobials:  ampicillin/sulbactam  IVPB 1.5 Gram(s) IV Intermittent every 6 hours      Other Medications:  sodium chloride 0.9%. 1000 milliLiter(s) IV Continuous <Continuous>  ondansetron Injectable 4 milliGRAM(s) IV Push every 8 hours PRN  famotidine    Tablet 20 milliGRAM(s) Oral two times a day  ALBUTerol    90 MICROgram(s) HFA Inhaler 2 Puff(s) Inhalation every 4 hours PRN  ALBUTerol/ipratropium for Nebulization 3 milliLiter(s) Nebulizer every 6 hours PRN  acetaminophen   Tablet. 650 milliGRAM(s) Oral every 6 hours PRN  lactobacillus acidophilus 1 Tablet(s) Oral daily  potassium acid phosphate/sodium acid phosphate tablet (K-PHOS No. 2) 1 Tablet(s) Oral four times a day with meals  diphenhydrAMINE   Capsule 25 milliGRAM(s) Oral every 6 hours PRN  hydrocortisone 0.5% Ointment 1 Application(s) Topical two times a day      FAMILY HISTORY:  Family history of diabetes mellitus (Aunt, Grandparent): aunts x 2 (paternal), grandfater (maternal)  Family history of hypertension (Mother, Father, Grandparent, Grandparent): parents, grandmothers  Family history of breast cancer (Aunt): aunts x 2 (paternal)  Family history of peripheral arterial disease (Mother): mother    PAST MEDICAL & SURGICAL HISTORY:  Lymphadenopathy  Asthma  S/P fine needle aspiration: - 2017    SOCIAL HISTORY:        Vital Signs Last 24 Hrs  T(C): 36.9 (2017 05:47), Max: 36.9 (2017 05:47)  T(F): 98.5 (2017 05:47), Max: 98.5 (2017 05:47)  HR: 78 (2017 05:47) (74 - 78)  BP: 122/68 (2017 05:47) (122/68 - 132/79)  BP(mean): --  RR: 18 (2017 05:47) (18 - 18)  SpO2: 99% (2017 05:47) (99% - 99%)    PHYSICAL EXAM  All physical exam findings normal, except for those marked:  General:	                    Normal: alert, no respiratory distress  .		[] Abnormal:  Eyes		Normal: no conjunctival injection, no discharge,  .		[] Abnormal:  ENT:		Normal: no pharyngeal erythema or exudates  .		[] Abnormal:  Neck		Normal: supple, full range of motion, no nuchal rigidity  .		[] Abnormal:  Lymph Nodes	Normal: normal size and consistency, non-tender  .		[] Abnormal:  Cardiovascular	Normal: regular rate and variability; Normal S1, S2; No murmur  .		[] Abnormal:  Respiratory	Normal: no wheezing or crackles, bilateral audible breath sounds  .		[] Abnormal:  Abdominal	                    Normal: soft; non-distended; non-tender; no hepatosplenomegaly or masses  .		[] Abnormal:  		Normal: normal external genitalia, no rash  .		[] Abnormal:  Extremities	Normal: FROM x4, no cyanosis or edema, symmetric pulses  .		[] Abnormal:  Skin		Normal: skin intact and not indurated; no rash, no desquamation  .		[] Abnormal:  Neurologic	                    Normal: alert, oriented as age-appropriate, moves all extremities,  .		[] Abnormal:  Musculoskeletal	Normal: no joint swelling, erythema, or tenderness;  .		[] Abnormal    Respiratory Support:		[] No	[] Yes:  Vasoactive medication infusion:	[] No	[] Yes:  Venous catheters:		[] No	[] Yes:  Bladder catheter:		[] No	[] Yes:  Other catheters or tubes:	[] No	[] Yes:    Lab Results:                        12.2   20.59 )-----------( 244      ( 2017 06:15 )             38.2         141  |  105  |  9   ----------------------------<  140<H>  4.3   |  24  |  0.72    Ca    9.1      2017 06:15  Phos  2.1       Mg     2.2         TPro  7.5  /  Alb  4.0  /  TBili  0.3  /  DBili  x   /  AST  21  /  ALT  14  /  AlkPhos  84      LIVER FUNCTIONS - ( 2017 15:05 )  Alb: 4.0 g/dL / Pro: 7.5 g/dL / ALK PHOS: 84 u/L / ALT: 14 u/L / AST: 21 u/L / GGT: x           PT/INR - ( 2017 16:03 )   PT: 12.2 SEC;   INR: 1.09          PTT - ( 2017 16:03 )  PTT:29.6 SEC  Urinalysis Basic - ( 2017 16:03 )    Color: YELLOW / Appearance: CLEAR / S.028 / pH: 5.5  Gluc: NEGATIVE / Ketone: NEGATIVE  / Bili: NEGATIVE / Urobili: NORMAL E.U.   Blood: NEGATIVE / Protein: 30 / Nitrite: NEGATIVE   Leuk Esterase: MODERATE / RBC: 2-5 / WBC 10-25   Sq Epi: OCC / Non Sq Epi: x / Bacteria: FEW    < from: CT Neck Soft Tissue w/ IV Cont (17 @ 18:21) >  IMPRESSION:   Redemonstration of acute right submandibular gland sialoadenitis.   Extensive surrounding inflammatory change appears grossly unchanged   compared to prior study.  No abscess collection is seen. No radiopaque calculi or ductal dilatation.    Extensive right-sided cervical adenopathy which has increased from the   prior exam. Considerations include a viral etiology or   lymphoproliferative disorder.    < end of copied text >          MICROBIOLOGY    Culture - Surg Site Aerob/Anaer w/Gm St (17 @ 17:40)    Gram Stain Wound:   NOS^No Organisms Seen  WBC^White Blood Cells  QNTY CELLS IN GRAM STAIN: FEW (2+)    Culture - Surgical Site:   CULTURE IN PROGRESS, FURTHER REPORT TO FOLLOW.    Specimen Source: OTHER Consultation Requested by:    Patient is a 27y old  Female who presents with a chief complaint of c/o pain , swelling in throat. (2017 17:46)    HPI:  27 year old female, with past history significant for Asthma, Lymphadenopathy, Fine needle aspiration, presented to the ED secondary to generalized rash and right face/neck swelling.  Seen and evaluated at bedside; NAD.  Patient relates that she was in the ED approximately 1.5 weeks ago and was prescribed clindamycin for infection of the salivary gland.  Notes that from the initial dose of clindamycin minor rashes were present on her thighs, but these were rationalized to be due to heat rash.  However, over time, as patient continued her TID clindamycin, she noted worsening erythematous, itchy rashes, which progressed to envelop the entire body.  Swelling of the jaw worsened and expanded from the right side of the jaw to underneath the jaw and the upper neck.  Had associated pain and difficulty swallowing.  Sometimes at nights, patient felt as if she was choking on her saliva because of inadequate ability to clear the secretions.  Stopped taking Clindamycin on Monday.  Subsequently, patient decided to return to the ED for evaluation/management.  Reports of associated chills and had bout of nausea with vomiting.    As past history of swelling, patient reports noting a palpable cyst-like structure underneath the jaw in 2017.  Subsequently evaluated by ENT and underwent CT scan of the neck, which demonstrated 7 to 8 enlarged lymph nodes.  In May, patient had fine needle aspiration of the cyst and after about 1.5 to 2 weeks post procedure, patient began to suffer with intermittent swelling of the right jaw, with never complete resolution.  In addition to home therapies (massage, warm compresses), patient was treated with Augmentin twice, but the swelling continued to occur.    Vital signs upon ED presentation as follows: BP = 140/69, Hr = 100, RR = 16, T = 37.5 C (99.5 F), O2 Sat = 100% on RA.  CT of Neck Soft Tissue w/ IC reflects "Redemonstration of acute right submandibular gland sialoadenitis.  Extensive surrounding inflammatory change appears grossly unchanged compared to prior study.  No abscess collection is seen. No radiopaque calculi or ductal dilatation.  Extensive right-sided cervical adenopathy which has increased from the prior exam. Considerations include a viral etiology or lymphoproliferative disorder."  Diagnosed with Sialadenitis.  Prescribed unasyn 3 grams, solu-medrol 125 mg, zofran 4 mg, pepcid 20 mg, tylenol 650 mg and sodium chloride 1 liter x one.  Already evaluated by ENT team in the ED, per reports (awaiting consult note). (2017 21:31)    Patient reports improvement in her submandibular swelling after the biopsy, but worsening of her rash. Patient reports completing 6 of 7 days of clindamycin before coming. She reports initial improvement of her rash on her first day in the hospital, but reports having worsening of her rash from this morning when she awake.   Patient also reports having an infection, possibly the flu, in March for which she was prescribed a Z-pack. She reports having a cough with sputum production, generalized malaise, fevers to 102 and a sore throat which all resolved in about a week or so. She had no change in her neck swelling during that time.   The initial neck mass did not increase in size per patient until about 1.5 weeks after her initial biopsy in May. She reports having 2x 2 week courses of Augmentin PO in May and  after the initially swelling worsened. The antibiotics may have improve the swelling a little, but the patient can't be sure. The swelling never fully resolved.   Patient reports she may have also had some low grade temps in , but never checked.         REVIEW OF SYSTEMS  All review of systems negative, except for those marked:  General:                         [] Abnormal:  	                    [] Night Sweats		[] Fever		[] Weight Loss  Pulmonary/Cough:	[] Abnormal:  Cardiac/Chest Pain:	[] Abnormal:  Gastrointestinal:	[] Abnormal:  Eyes:		[] Abnormal:  ENT:		[X] Abnormal: neck swelling and warmth   :		[] Abnormal:  Musculoskeletal	[] Abnormal:  Endocrine:		[] Abnormal:  Lymph Nodes:	[] Abnormal:  Neuro:		[] Abnormal:  Skin:		[] Abnormal:  Allergy/Immune:	[X] Abnormal: drug rash and itching   Psychiatric:	[] Abnormal:  [X] All other review of systems negative  [] Unable to obtain (explain):    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 1000 milliLiter(s) IV Continuous <Continuous>  ondansetron Injectable 4 milliGRAM(s) IV Push every 8 hours PRN  famotidine    Tablet 20 milliGRAM(s) Oral two times a day  ALBUTerol    90 MICROgram(s) HFA Inhaler 2 Puff(s) Inhalation every 4 hours PRN  ALBUTerol/ipratropium for Nebulization 3 milliLiter(s) Nebulizer every 6 hours PRN  acetaminophen   Tablet. 650 milliGRAM(s) Oral every 6 hours PRN  lactobacillus acidophilus 1 Tablet(s) Oral daily  potassium acid phosphate/sodium acid phosphate tablet (K-PHOS No. 2) 1 Tablet(s) Oral four times a day with meals  diphenhydrAMINE   Capsule 25 milliGRAM(s) Oral every 6 hours PRN  hydrocortisone 0.5% Ointment 1 Application(s) Topical two times a day      FAMILY HISTORY:  Family history of diabetes mellitus (Aunt, Grandparent): aunts x 2 (paternal), grandfater (maternal)  Family history of hypertension (Mother, Father, Grandparent, Grandparent): parents, grandmothers  Family history of breast cancer (Aunt): aunts x 2 (paternal)  Family history of peripheral arterial disease (Mother): mother    PAST MEDICAL & SURGICAL HISTORY:  Lymphadenopathy  Asthma  S/P fine needle aspiration: - 2017    SOCIAL HISTORY:  Works as an LPN  Social drinker,   Denies illicit drug use or smoking.       Vital Signs Last 24 Hrs  T(C): 36.9 (2017 05:47), Max: 36.9 (2017 05:47)  T(F): 98.5 (2017 05:47), Max: 98.5 (2017 05:47)  HR: 78 (2017 05:47) (74 - 78)  BP: 122/68 (2017 05:47) (122/68 - 132/79)  BP(mean): --  RR: 18 (2017 05:47) (18 - 18)  SpO2: 99% (2017 05:47) (99% - 99%)    PHYSICAL EXAM:    GENERAL: Comfortable, no acute distress, obese female   HEAD:  Normocephalic, atraumatic  EYES: EOMI, PERRLA  HEENT: Moist mucous membranes, large firm submandibular mass, mobile and nontender to palpation measuring about 3cm x1cm  NECK: Supple, No JVD  NERVOUS SYSTEM:  Alert & Oriented X3, Motor Strength 5/5 B/L upper and lower extremities  CHEST/LUNG: Clear to auscultation bilaterally  HEART: Regular rate and rhythm, no murmur   ABDOMEN: Soft, Nontender, Nondistended, Bowel sounds present  EXTREMITIES:   No clubbing, cyanosis, or edema  MUSCULOSKELETAL: No muscle tenderness, no joint tenderness  SKIN:  warm and dry, erythematous rash present on arms and thighs extending to chest           Respiratory Support:		[X] No	[] Yes:  Vasoactive medication infusion:	[X] No	[] Yes:  Venous catheters:		[X] No	[] Yes:  Bladder catheter:		[X] No	[] Yes:  Other catheters or tubes:	[X] No	[] Yes:    Lab Results:                        12.2   20.59 )-----------( 244      ( 2017 06:15 )             38.2     07-    141  |  105  |  9   ----------------------------<  140<H>  4.3   |  24  |  0.72    Ca    9.1      2017 06:15  Phos  2.1     07-  Mg     2.2     07-    TPro  7.5  /  Alb  4.0  /  TBili  0.3  /  DBili  x   /  AST  21  /  ALT  14  /  AlkPhos  84  -    LIVER FUNCTIONS - ( 2017 15:05 )  Alb: 4.0 g/dL / Pro: 7.5 g/dL / ALK PHOS: 84 u/L / ALT: 14 u/L / AST: 21 u/L / GGT: x           PT/INR - ( 2017 16:03 )   PT: 12.2 SEC;   INR: 1.09          PTT - ( 2017 16:03 )  PTT:29.6 SEC  Urinalysis Basic - ( 2017 16:03 )    Color: YELLOW / Appearance: CLEAR / S.028 / pH: 5.5  Gluc: NEGATIVE / Ketone: NEGATIVE  / Bili: NEGATIVE / Urobili: NORMAL E.U.   Blood: NEGATIVE / Protein: 30 / Nitrite: NEGATIVE   Leuk Esterase: MODERATE / RBC: 2-5 / WBC 10-25   Sq Epi: OCC / Non Sq Epi: x / Bacteria: FEW    < from: CT Neck Soft Tissue w/ IV Cont (17 @ 18:21) >  IMPRESSION:   Redemonstration of acute right submandibular gland sialoadenitis.   Extensive surrounding inflammatory change appears grossly unchanged   compared to prior study.  No abscess collection is seen. No radiopaque calculi or ductal dilatation.    Extensive right-sided cervical adenopathy which has increased from the   prior exam. Considerations include a viral etiology or   lymphoproliferative disorder.    < end of copied text >          MICROBIOLOGY    Culture - Surg Site Aerob/Anaer w/Gm St (17 @ 17:40)    Gram Stain Wound:   NOS^No Organisms Seen  WBC^White Blood Cells  QNTY CELLS IN GRAM STAIN: FEW (2+)    Culture - Surgical Site:   CULTURE IN PROGRESS, FURTHER REPORT TO FOLLOW.    Specimen Source: OTHER Consultation Requested by:    Patient is a 27y old  Female who presents with a chief complaint of c/o pain , swelling in throat. (2017 17:46)    HPI:  27 year old female, with past history significant for Asthma, Lymphadenopathy, Fine needle aspiration, presented to the ED secondary to generalized rash and right face/neck swelling.  Seen and evaluated at bedside; NAD.  Patient relates that she was in the ED approximately 1.5 weeks ago and was prescribed clindamycin for infection of the salivary gland.  Notes that from the initial dose of clindamycin minor rashes were present on her thighs, but these were rationalized to be due to heat rash.  However, over time, as patient continued her TID clindamycin, she noted worsening erythematous, itchy rashes, which progressed to envelop the entire body.  Swelling of the jaw worsened and expanded from the right side of the jaw to underneath the jaw and the upper neck.  Had associated pain and difficulty swallowing.  Sometimes at nights, patient felt as if she was choking on her saliva because of inadequate ability to clear the secretions.  Stopped taking Clindamycin on Monday.  Subsequently, patient decided to return to the ED for evaluation/management.  Reports of associated chills and had bout of nausea with vomiting.    As past history of swelling, patient reports noting a palpable cyst-like structure underneath the jaw in 2017.  Subsequently evaluated by ENT and underwent CT scan of the neck, which demonstrated 7 to 8 enlarged lymph nodes.  In May, patient had fine needle aspiration of the cyst and after about 1.5 to 2 weeks post procedure, patient began to suffer with intermittent swelling of the right jaw, with never complete resolution.  In addition to home therapies (massage, warm compresses), patient was treated with Augmentin twice, but the swelling continued to occur.    Vital signs upon ED presentation as follows: BP = 140/69, Hr = 100, RR = 16, T = 37.5 C (99.5 F), O2 Sat = 100% on RA.  CT of Neck Soft Tissue w/ IC reflects "Redemonstration of acute right submandibular gland sialoadenitis.  Extensive surrounding inflammatory change appears grossly unchanged compared to prior study.  No abscess collection is seen. No radiopaque calculi or ductal dilatation.  Extensive right-sided cervical adenopathy which has increased from the prior exam. Considerations include a viral etiology or lymphoproliferative disorder."  Diagnosed with Sialadenitis.  Prescribed unasyn 3 grams, solu-medrol 125 mg, zofran 4 mg, pepcid 20 mg, tylenol 650 mg and sodium chloride 1 liter x one.  Already evaluated by ENT team in the ED, per reports (awaiting consult note). (2017 21:31)    Patient reports improvement in her submandibular swelling after the biopsy, but worsening of her rash. Patient reports completing 6 of 7 days of clindamycin before coming. She reports initial improvement of her rash on her first day in the hospital, but reports having worsening of her rash from this morning when she awake.   Patient also reports having an infection, possibly the flu, in March for which she was prescribed a Z-pack. She reports having a cough with sputum production, generalized malaise, fevers to 102 and a sore throat which all resolved in about a week or so. She had no change in her neck swelling during that time.   The initial neck mass did not increase in size per patient until about 1.5 weeks after her initial biopsy in May. She reports having 2x 2 week courses of Augmentin PO in May and  after the initially swelling worsened. The antibiotics may have improve the swelling a little, but the patient can't be sure. The swelling never fully resolved.   Patient reports she may have also had some low grade temps in , but never checked.   Denies any F/C, N/V, CP, SOB, unintentional weight changes, night sweats, constipation or diarrhea.         REVIEW OF SYSTEMS  All review of systems negative, except for those marked:  General:                         [] Abnormal:  	                    [] Night Sweats		[] Fever		[] Weight Loss  Pulmonary/Cough:	[] Abnormal:  Cardiac/Chest Pain:	[] Abnormal:  Gastrointestinal:	[] Abnormal:  Eyes:		[] Abnormal:  ENT:		[X] Abnormal: neck swelling and warmth   :		[] Abnormal:  Musculoskeletal	[] Abnormal:  Endocrine:		[] Abnormal:  Lymph Nodes:	[] Abnormal:  Neuro:		[] Abnormal:  Skin:		[] Abnormal:  Allergy/Immune:	[X] Abnormal: drug rash and itching   Psychiatric:	[] Abnormal:  [X] All other review of systems negative  [] Unable to obtain (explain):    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 1000 milliLiter(s) IV Continuous <Continuous>  ondansetron Injectable 4 milliGRAM(s) IV Push every 8 hours PRN  famotidine    Tablet 20 milliGRAM(s) Oral two times a day  ALBUTerol    90 MICROgram(s) HFA Inhaler 2 Puff(s) Inhalation every 4 hours PRN  ALBUTerol/ipratropium for Nebulization 3 milliLiter(s) Nebulizer every 6 hours PRN  acetaminophen   Tablet. 650 milliGRAM(s) Oral every 6 hours PRN  lactobacillus acidophilus 1 Tablet(s) Oral daily  potassium acid phosphate/sodium acid phosphate tablet (K-PHOS No. 2) 1 Tablet(s) Oral four times a day with meals  diphenhydrAMINE   Capsule 25 milliGRAM(s) Oral every 6 hours PRN  hydrocortisone 0.5% Ointment 1 Application(s) Topical two times a day      FAMILY HISTORY:  Family history of diabetes mellitus (Aunt, Grandparent): aunts x 2 (paternal), grandfater (maternal)  Family history of hypertension (Mother, Father, Grandparent, Grandparent): parents, grandmothers  Family history of breast cancer (Aunt): aunts x 2 (paternal)  Family history of peripheral arterial disease (Mother): mother    PAST MEDICAL & SURGICAL HISTORY:  Lymphadenopathy  Asthma  S/P fine needle aspiration: - 2017    SOCIAL HISTORY:  Works as an LPN  Social drinker,   Denies illicit drug use or smoking.       Vital Signs Last 24 Hrs  T(C): 36.9 (2017 05:47), Max: 36.9 (2017 05:47)  T(F): 98.5 (2017 05:47), Max: 98.5 (2017 05:47)  HR: 78 (2017 05:47) (74 - 78)  BP: 122/68 (2017 05:47) (122/68 - 132/79)  BP(mean): --  RR: 18 (2017 05:47) (18 - 18)  SpO2: 99% (2017 05:47) (99% - 99%)    PHYSICAL EXAM:    GENERAL: Comfortable, no acute distress, obese female   HEAD:  Normocephalic, atraumatic  EYES: EOMI, PERRLA  HEENT: Moist mucous membranes, large firm submandibular mass, mobile and nontender to palpation measuring about 3cm x1cm  NECK: Supple, No JVD  NERVOUS SYSTEM:  Alert & Oriented X3, Motor Strength 5/5 B/L upper and lower extremities  CHEST/LUNG: Clear to auscultation bilaterally  HEART: Regular rate and rhythm, no murmur   ABDOMEN: Soft, Nontender, Nondistended, Bowel sounds present  EXTREMITIES:   No clubbing, cyanosis, or edema  MUSCULOSKELETAL: No muscle tenderness, no joint tenderness  SKIN:  warm and dry, erythematous rash present on arms and thighs extending to chest           Respiratory Support:		[X] No	[] Yes:  Vasoactive medication infusion:	[X] No	[] Yes:  Venous catheters:		[X] No	[] Yes:  Bladder catheter:		[X] No	[] Yes:  Other catheters or tubes:	[X] No	[] Yes:    Lab Results:                        12.2   20.59 )-----------( 244      ( 2017 06:15 )             38.2     07-28    141  |  105  |  9   ----------------------------<  140<H>  4.3   |  24  |  0.72    Ca    9.1      2017 06:15  Phos  2.1     07-27  Mg     2.2     07-27    TPro  7.5  /  Alb  4.0  /  TBili  0.3  /  DBili  x   /  AST  21  /  ALT  14  /  AlkPhos  84  07-26    LIVER FUNCTIONS - ( 2017 15:05 )  Alb: 4.0 g/dL / Pro: 7.5 g/dL / ALK PHOS: 84 u/L / ALT: 14 u/L / AST: 21 u/L / GGT: x           PT/INR - ( 2017 16:03 )   PT: 12.2 SEC;   INR: 1.09          PTT - ( 2017 16:03 )  PTT:29.6 SEC  Urinalysis Basic - ( 2017 16:03 )    Color: YELLOW / Appearance: CLEAR / S.028 / pH: 5.5  Gluc: NEGATIVE / Ketone: NEGATIVE  / Bili: NEGATIVE / Urobili: NORMAL E.U.   Blood: NEGATIVE / Protein: 30 / Nitrite: NEGATIVE   Leuk Esterase: MODERATE / RBC: 2-5 / WBC 10-25   Sq Epi: OCC / Non Sq Epi: x / Bacteria: FEW    < from: CT Neck Soft Tissue w/ IV Cont (17 @ 18:21) >  IMPRESSION:   Redemonstration of acute right submandibular gland sialoadenitis.   Extensive surrounding inflammatory change appears grossly unchanged   compared to prior study.  No abscess collection is seen. No radiopaque calculi or ductal dilatation.    Extensive right-sided cervical adenopathy which has increased from the   prior exam. Considerations include a viral etiology or   lymphoproliferative disorder.    < end of copied text >          MICROBIOLOGY    Culture - Surg Site Aerob/Anaer w/Gm St (17 @ 17:40)    Gram Stain Wound:   NOS^No Organisms Seen  WBC^White Blood Cells  QNTY CELLS IN GRAM STAIN: FEW (2+)    Culture - Surgical Site:   CULTURE IN PROGRESS, FURTHER REPORT TO FOLLOW.    Specimen Source: OTHER

## 2017-07-28 NOTE — DIETITIAN INITIAL EVALUATION ADULT. - ENERGY NEEDS
Weight: 308 pounds (140 kg) (7/27)  Height: 70 inches  BMI: 44.3 kg/m^2  Ideal body weight: 150 pounds (68 kg)

## 2017-07-28 NOTE — CONSULT NOTE ADULT - PROBLEM SELECTOR RECOMMENDATION 9
History and exam highly suggestive of morbiliform drug eruption  -typically, the onset of this eruption is 7-14 days or longer after initiation of drug. Given pt's history of multiple new antibiotics in the last 1-2 months, cannot precisely identify causative agent, however feel likelihood of augmentin being causative is greater than clindamycin given the timing  -eruption likely to worsen over next few days before becoming scaly and resolving  -would also recommend checking Mono given lymphadenopathy and rash  -switch topical hydrocortisone to triamcinolone ointment 0.1 BID prn itch x 2 weeks, not for face for skin folds  -can c/w antihistamines prn itch  -no eosinophilia, transaminitis, JULITO or fever to suggest systemic drug reaction  -trend CBC w/ diff and LFTs x 3d
- Had multiple antibiotic courses in past with little to no improvement   - monitor off antibiotics for now  - treat drug reaction to clindamycin, unclear if patient has an allergy to Unasyn as well  - consider autoimmune workup depending on pathology results

## 2017-07-28 NOTE — PROGRESS NOTE ADULT - PROBLEM SELECTOR PLAN 6
- pepcid, solu-medrol  - ambulate as tolerated  - out of bed to chair BID No issues presently ,- no recent exacerbations  has albuterol HFA and nebulizer at home (prescribed in-House)

## 2017-07-28 NOTE — CONSULT NOTE ADULT - SUBJECTIVE AND OBJECTIVE BOX
HPI:  27 year old female, with past history significant for Asthma, p/w rash on  to ED. Rash noted to have started on thighs on , reportedly same day as starting clindamycin po. Of note, pt has had several month history of submandibular swelling and lymphadenopathy, s/p several antibiotics courses (reportedly amoxicillin and augmentin) without improvement, also underwent FNA (reportedly reactive). Initially the patient attributed her rash to heat rash but it continued to spread. It is itchy. She has continued the clindamycin until Monday the . She denies ever taking clindamycin previously. She did not report any reaction to augmentin or other penicillins. Feels like she may have a had a mild skin rash to azithromycin previously. Denies fevers/chills, eye/mouth or genital pain, no skin pain, no abd pain.  In ED switched to Unasyn.      PAST MEDICAL & SURGICAL HISTORY:  Lymphadenopathy  Asthma  S/P fine needle aspiration: - 2017      REVIEW OF SYSTEMS      General: no fevers/chills, no lethary	    Skin/Breast: see HPI  	  Ophthalmologic: no eye pain or change in vision  	  ENMT: no dysphagia or change in hearing    Respiratory and Thorax: no SOB or cough  	  Cardiovascular: no palpitations or chest pain    Gastrointestinal: no abdomenal pain or blood in stool     Genitourinary: no dysuria or frequency    Musculoskeletal: no joint pains or weakness	    Neurological:no weakness, numbness , or tingling    MEDICATIONS  (STANDING):  famotidine    Tablet 20 milliGRAM(s) Oral two times a day  lactobacillus acidophilus 1 Tablet(s) Oral daily  potassium acid phosphate/sodium acid phosphate tablet (K-PHOS No. 2) 1 Tablet(s) Oral four times a day with meals  hydrocortisone 0.5% Ointment 1 Application(s) Topical two times a day  methylPREDNISolone sodium succinate Injectable 40 milliGRAM(s) IV Push every 8 hours    MEDICATIONS  (PRN):  ondansetron Injectable 4 milliGRAM(s) IV Push every 8 hours PRN Nausea and/or Vomiting  ALBUTerol    90 MICROgram(s) HFA Inhaler 2 Puff(s) Inhalation every 4 hours PRN Shortness of Breath and/or Wheezing  ALBUTerol/ipratropium for Nebulization 3 milliLiter(s) Nebulizer every 6 hours PRN Shortness of Breath and/or Wheezing  acetaminophen   Tablet. 650 milliGRAM(s) Oral every 6 hours PRN Mild Pain (1 - 3)  diphenhydrAMINE   Capsule 50 milliGRAM(s) Oral every 4 hours PRN Rash and/or Itching      Allergies    azithromycin (Rash)  clindamycin (Pruritus; Rash)  Seafood (Hives; Short breath)    FAMILY HISTORY:  Family history of diabetes mellitus (Aunt, Grandparent): aunts x 2 (paternal), grandfater (maternal)  Family history of hypertension (Mother, Father, Grandparent, Grandparent): parents, grandmothers  Family history of breast cancer (Aunt): aunts x 2 (paternal)  Family history of peripheral arterial disease (Mother): mother      Vital Signs Last 24 Hrs  T(C): 36.9 (2017 12:33), Max: 36.9 (2017 05:47)  T(F): 98.5 (2017 12:33), Max: 98.5 (2017 05:47)  HR: 77 (2017 12:33) (74 - 78)  BP: 109/66 (2017 12:33) (109/66 - 132/79)  BP(mean): --  RR: 16 (2017 12:33) (16 - 18)  SpO2: 100% (2017 12:33) (99% - 100%)    PHYSICAL EXAM:     The patient was alert and oriented X 3, well nourished, and in no  apparent distress.  OP showed no ulcerations  There was no visible lymphadenopathy.  Conjunctiva were non injected  There was no clubbing or edema of extremities.  The scalp, hair, face, eyebrows, lips, OP, neck, chest, back,   extremities X 4, nails were examined.  There was no hyperhidrosis or bromhidrosis.    Of note on skin exam:     On central chest, back, and proximal extremities are pink edematous papules coalescing into plaques in areas       LABS:                        12.2   20.59 )-----------( 244      ( 2017 06:15 )             38.2         141  |  105  |  9   ----------------------------<  140<H>  4.3   |  24  |  0.72    Ca    9.1      2017 06:15  Phos  2.1     -  Mg     2.2     -    TPro  7.5  /  Alb  4.0  /  TBili  0.3  /  DBili  x   /  AST  21  /  ALT  14  /  AlkPhos  84  -    PT/INR - ( 2017 16:03 )   PT: 12.2 SEC;   INR: 1.09          PTT - ( 2017 16:03 )  PTT:29.6 SEC  Urinalysis Basic - ( 2017 16:03 )    Color: YELLOW / Appearance: CLEAR / S.028 / pH: 5.5  Gluc: NEGATIVE / Ketone: NEGATIVE  / Bili: NEGATIVE / Urobili: NORMAL E.U.   Blood: NEGATIVE / Protein: 30 / Nitrite: NEGATIVE   Leuk Esterase: MODERATE / RBC: 2-5 / WBC 10-25   Sq Epi: OCC / Non Sq Epi: x / Bacteria: FEW

## 2017-07-28 NOTE — PROGRESS NOTE ADULT - PROBLEM SELECTOR PLAN 7
- regular; consistent carb - pepcid, solu-medrol  - ambulate as tolerated  - out of bed to chair BID

## 2017-07-28 NOTE — PROGRESS NOTE ADULT - PROBLEM SELECTOR PLAN 1
Recurrent since May 2017after FNA of lymph node, but cyst-like mass first noted submandibular area in 01/2017. Reviewed  CT scan of soft tissues of the neck  - study for Fiorella-Barr nuclear antigen and IgG positive, CMV PCR negative, Hepatitis C virus RNA by PCR negative, Toxoplasma DNA negative, HIV negative  Not resolved with augmentin therapy twice, allergic reaction to recent clindamycin therapy- now started on unasyn 3 grams; continued 1.5 grams Q6H  On solu-medrol 40 mg Q8H x 3 doses  pepcid daily, zofran   SHALINI  ENT - recommend core biopsy by IR.  s/p IR guided biopsy of LN on 7/27.Gram stain - no organism seen   ID called- SHALINI ID diffuse maculopapular rash likely cw Drug rash - Will dc Unasyn .Await ID suyapa- re Abx. Will increase benadryl to 50, will continue Solumedrol (will increase to 40 Q 8 )  Derm consult called

## 2017-07-28 NOTE — PROGRESS NOTE ADULT - PROBLEM SELECTOR PLAN 2
Await IR guided biospy  FNA study performed in 05/2017 - reactive   - follow for improvement/resolution on antibiotic, steroid  Await ID recos Recurrent since May 2017after FNA of lymph node, but cyst-like mass first noted submandibular area in 01/2017. Reviewed  CT scan of soft tissues of the neck  - study for Fiorella-Barr nuclear antigen and IgG positive, CMV PCR negative, Hepatitis C virus RNA by PCR negative, Toxoplasma DNA negative, HIV negative  Not resolved with augmentin therapy twice, allergic reaction to recent clindamycin therapy- now started on unasyn 3 grams; continued 1.5 grams Q6H  On solu-medrol 40 mg Q8H x 3 doses  pepcid daily, zofran   SHALINI  ENT - recommend core biopsy by IR.  s/p IR guided biopsy of LN on 7/27.Gram stain - no organism seen   ID called- DW ID- Await results

## 2017-07-28 NOTE — PROGRESS NOTE ADULT - PROBLEM SELECTOR PLAN 4
Asymptomatic- continue to monitor  moderate leukocyte esterase, proteinuria = 30 glucose = 116, hemoglobin A1c = 5.8 (07/16/2017)  Nutrition counseling.- consistent carb diet

## 2017-07-28 NOTE — PROGRESS NOTE ADULT - PROBLEM SELECTOR PLAN 5
No issues presently ,- no recent exacerbations  has albuterol HFA and nebulizer at home (prescribed in-House) Asymptomatic- continue to monitor  moderate leukocyte esterase, proteinuria = 30

## 2017-07-28 NOTE — PROGRESS NOTE ADULT - PROBLEM SELECTOR PLAN 3
glucose = 116, hemoglobin A1c = 5.8 (07/16/2017)  Nutrition counseling.- consistent carb diet Await IR guided biospy  FNA study performed in 05/2017 - reactive   - follow for improvement/resolution on antibiotic, steroid

## 2017-07-28 NOTE — CONSULT NOTE ADULT - ATTENDING COMMENTS
28 y/o F, with past history significant for Asthma, Lymphadenopathy, Fine needle aspiration 5/2017 of submandibular lymph node found to have reactive lymph node.  Treated with multiple courses of antibiotics w/o improvement.  Came to ER 7/16 and treated for sialoadenitis with clindamycin but developed rash.  Presented to the ED secondary to generalized rash and right face/neck swelling. Admitted for further management of Sialadenitis, and Lymphadenopathy.  S/p Core biopsy 7/27/17.  Rash worsened, ?allergic reaction to unasyn.      CT of neck was stable.  Infection seems less likely at this point as this has been going on for some time and did not have improvement with antibiotics.  Would observe off of antibiotics for now and await pathology.    Cultures in lab - follow up cultures.  Supportive care for rash/itching.  If any change in status, can give vanco, aztreonam, flagyl.  If path not conclusive, would also check autoimmune work-up.     d/w ADS.    I am away starting tomorrow. My associate will see pt.    Lyn Martinez MD  [phone] (pager)  [phone] (office)

## 2017-07-29 LAB
ALBUMIN SERPL ELPH-MCNC: 3.6 G/DL — SIGNIFICANT CHANGE UP (ref 3.3–5)
ALP SERPL-CCNC: 76 U/L — SIGNIFICANT CHANGE UP (ref 40–120)
ALT FLD-CCNC: 14 U/L — SIGNIFICANT CHANGE UP (ref 4–33)
AST SERPL-CCNC: 20 U/L — SIGNIFICANT CHANGE UP (ref 4–32)
BASOPHILS # BLD AUTO: 0.05 K/UL — SIGNIFICANT CHANGE UP (ref 0–0.2)
BASOPHILS NFR BLD AUTO: 0.5 % — SIGNIFICANT CHANGE UP (ref 0–2)
BILIRUB SERPL-MCNC: 0.2 MG/DL — SIGNIFICANT CHANGE UP (ref 0.2–1.2)
BUN SERPL-MCNC: 10 MG/DL — SIGNIFICANT CHANGE UP (ref 7–23)
CALCIUM SERPL-MCNC: 8.7 MG/DL — SIGNIFICANT CHANGE UP (ref 8.4–10.5)
CHLORIDE SERPL-SCNC: 104 MMOL/L — SIGNIFICANT CHANGE UP (ref 98–107)
CO2 SERPL-SCNC: 23 MMOL/L — SIGNIFICANT CHANGE UP (ref 22–31)
CREAT SERPL-MCNC: 0.77 MG/DL — SIGNIFICANT CHANGE UP (ref 0.5–1.3)
EOSINOPHIL # BLD AUTO: 0.26 K/UL — SIGNIFICANT CHANGE UP (ref 0–0.5)
EOSINOPHIL NFR BLD AUTO: 2.5 % — SIGNIFICANT CHANGE UP (ref 0–6)
GLUCOSE SERPL-MCNC: 91 MG/DL — SIGNIFICANT CHANGE UP (ref 70–99)
HCT VFR BLD CALC: 40 % — SIGNIFICANT CHANGE UP (ref 34.5–45)
HGB BLD-MCNC: 12.5 G/DL — SIGNIFICANT CHANGE UP (ref 11.5–15.5)
IMM GRANULOCYTES # BLD AUTO: 0.05 # — SIGNIFICANT CHANGE UP
IMM GRANULOCYTES NFR BLD AUTO: 0.5 % — SIGNIFICANT CHANGE UP (ref 0–1.5)
LYMPHOCYTES # BLD AUTO: 1.18 K/UL — SIGNIFICANT CHANGE UP (ref 1–3.3)
LYMPHOCYTES # BLD AUTO: 11.5 % — LOW (ref 13–44)
MCHC RBC-ENTMCNC: 27.7 PG — SIGNIFICANT CHANGE UP (ref 27–34)
MCHC RBC-ENTMCNC: 31.3 % — LOW (ref 32–36)
MCV RBC AUTO: 88.5 FL — SIGNIFICANT CHANGE UP (ref 80–100)
MONOCYTES # BLD AUTO: 0.5 K/UL — SIGNIFICANT CHANGE UP (ref 0–0.9)
MONOCYTES NFR BLD AUTO: 4.9 % — SIGNIFICANT CHANGE UP (ref 2–14)
NEUTROPHILS # BLD AUTO: 8.18 K/UL — HIGH (ref 1.8–7.4)
NEUTROPHILS NFR BLD AUTO: 80.1 % — HIGH (ref 43–77)
NRBC # FLD: 0 — SIGNIFICANT CHANGE UP
PLATELET # BLD AUTO: 217 K/UL — SIGNIFICANT CHANGE UP (ref 150–400)
PMV BLD: 10.9 FL — SIGNIFICANT CHANGE UP (ref 7–13)
POTASSIUM SERPL-MCNC: 4.2 MMOL/L — SIGNIFICANT CHANGE UP (ref 3.5–5.3)
POTASSIUM SERPL-SCNC: 4.2 MMOL/L — SIGNIFICANT CHANGE UP (ref 3.5–5.3)
PROT SERPL-MCNC: 7.1 G/DL — SIGNIFICANT CHANGE UP (ref 6–8.3)
RBC # BLD: 4.52 M/UL — SIGNIFICANT CHANGE UP (ref 3.8–5.2)
RBC # FLD: 13.7 % — SIGNIFICANT CHANGE UP (ref 10.3–14.5)
SODIUM SERPL-SCNC: 140 MMOL/L — SIGNIFICANT CHANGE UP (ref 135–145)
WBC # BLD: 10.22 K/UL — SIGNIFICANT CHANGE UP (ref 3.8–10.5)
WBC # FLD AUTO: 10.22 K/UL — SIGNIFICANT CHANGE UP (ref 3.8–10.5)

## 2017-07-29 PROCEDURE — 99232 SBSQ HOSP IP/OBS MODERATE 35: CPT

## 2017-07-29 RX ADMIN — Medication 1 TABLET(S): at 11:53

## 2017-07-29 RX ADMIN — Medication 50 MILLIGRAM(S): at 11:53

## 2017-07-29 RX ADMIN — Medication 1 TABLET(S): at 17:47

## 2017-07-29 RX ADMIN — Medication 50 MILLIGRAM(S): at 21:29

## 2017-07-29 RX ADMIN — Medication 1 TABLET(S): at 09:23

## 2017-07-29 RX ADMIN — Medication 650 MILLIGRAM(S): at 15:05

## 2017-07-29 RX ADMIN — FAMOTIDINE 20 MILLIGRAM(S): 10 INJECTION INTRAVENOUS at 06:22

## 2017-07-29 RX ADMIN — Medication 1 TABLET(S): at 22:47

## 2017-07-29 RX ADMIN — FAMOTIDINE 20 MILLIGRAM(S): 10 INJECTION INTRAVENOUS at 17:47

## 2017-07-29 RX ADMIN — Medication 650 MILLIGRAM(S): at 16:05

## 2017-07-29 RX ADMIN — Medication 50 MILLIGRAM(S): at 06:21

## 2017-07-29 RX ADMIN — Medication 40 MILLIGRAM(S): at 15:01

## 2017-07-29 NOTE — PROGRESS NOTE ADULT - PROBLEM SELECTOR PLAN 1
-seems chronic at this point  -no fever or WBC  -hold off abx  -f/u cx and path  -check EBV serologies

## 2017-07-29 NOTE — PROGRESS NOTE ADULT - SUBJECTIVE AND OBJECTIVE BOX
DAVIDSON MCRAE 27y [id number]    Patient is a 27y old  Female who presents with a chief complaint of c/o pain , swelling in throat. (27 Jul 2017 17:46)      Follow Up/CC:  submandibular swelling    Interval History/ROS: rash more painful on UE    Allergies    azithromycin (Rash)  clindamycin (Pruritus; Rash)  Seafood (Hives; Short breath)    Intolerances        ANTIMICROBIALS:      MEDICATIONS  (STANDING):  famotidine    Tablet 20 milliGRAM(s) Oral two times a day  lactobacillus acidophilus 1 Tablet(s) Oral daily  potassium acid phosphate/sodium acid phosphate tablet (K-PHOS No. 2) 1 Tablet(s) Oral four times a day with meals    MEDICATIONS  (PRN):  ondansetron Injectable 4 milliGRAM(s) IV Push every 8 hours PRN Nausea and/or Vomiting  ALBUTerol    90 MICROgram(s) HFA Inhaler 2 Puff(s) Inhalation every 4 hours PRN Shortness of Breath and/or Wheezing  ALBUTerol/ipratropium for Nebulization 3 milliLiter(s) Nebulizer every 6 hours PRN Shortness of Breath and/or Wheezing  acetaminophen   Tablet. 650 milliGRAM(s) Oral every 6 hours PRN Mild Pain (1 - 3)  diphenhydrAMINE   Capsule 50 milliGRAM(s) Oral every 4 hours PRN Rash and/or Itching  triamcinolone 0.1% Ointment 1 Application(s) Topical two times a day PRN itching        Vital Signs Last 24 Hrs  T(C): 36.8 (29 Jul 2017 04:52), Max: 36.9 (28 Jul 2017 12:33)  T(F): 98.2 (29 Jul 2017 04:52), Max: 98.5 (28 Jul 2017 12:33)  HR: 68 (29 Jul 2017 04:52) (68 - 77)  BP: 134/84 (29 Jul 2017 04:52) (109/66 - 134/84)  BP(mean): --  RR: 18 (29 Jul 2017 04:52) (16 - 18)  SpO2: 100% (29 Jul 2017 04:52) (100% - 100%)    CBC Full  -  ( 29 Jul 2017 06:05 )  WBC Count : 10.22 K/uL  Hemoglobin : 12.5 g/dL  Hematocrit : 40.0 %  Platelet Count - Automated : 217 K/uL  Mean Cell Volume : 88.5 fL  Mean Cell Hemoglobin : 27.7 pg  Mean Cell Hemoglobin Concentration : 31.3 %  Auto Neutrophil # : 8.18 K/uL  Auto Lymphocyte # : 1.18 K/uL  Auto Monocyte # : 0.50 K/uL  Auto Eosinophil # : 0.26 K/uL  Auto Basophil # : 0.05 K/uL  Auto Neutrophil % : 80.1 %  Auto Lymphocyte % : 11.5 %  Auto Monocyte % : 4.9 %  Auto Eosinophil % : 2.5 %  Auto Basophil % : 0.5 %    07-29    140  |  104  |  10  ----------------------------<  91  4.2   |  23  |  0.77    Ca    8.7      29 Jul 2017 06:05    TPro  7.1  /  Alb  3.6  /  TBili  0.2  /  DBili  x   /  AST  20  /  ALT  14  /  AlkPhos  76  07-29    LIVER FUNCTIONS - ( 29 Jul 2017 06:05 )  Alb: 3.6 g/dL / Pro: 7.1 g/dL / ALK PHOS: 76 u/L / ALT: 14 u/L / AST: 20 u/L / GGT: x               MICROBIOLOGY:    Culture - Surg Site Aerob/Anaer w/Gm St (07.27.17 @ 17:40)    Gram Stain Wound:   NOS^No Organisms Seen  WBC^White Blood Cells  QNTY CELLS IN GRAM STAIN: FEW (2+)    Culture - Surgical Site:   CULTURE IN PROGRESS, FURTHER REPORT TO FOLLOW.    Specimen Source: OTHER    HIV-1/2 Ag/Ab Combo (07.16.17 @ 15:03)    HIV-1 Ag: NON-REACTIVE If patient is suspected to be at risk and/or in the  diagnostic window period, then consider subsequent HIV  retesting with new sample.    HIV-1/2 Ab: NON-REACTIVE If patient is suspected to be at risk and/or in the  diagnostic window period, then consider subsequent HIV  retesting with new sample.    Cytomegalovirus By PCR (07.16.17 @ 15:03)    Cytomegalovirus By PCR: Not detected IU/mL        RADIOLOGY    CXR:    CT HEAD:    CT CHEST:    CT ABDOMEN:    MRI:    OTHER:

## 2017-07-29 NOTE — PROGRESS NOTE ADULT - SUBJECTIVE AND OBJECTIVE BOX
Patient is a 27y old  Female who presents with a chief complaint of c/o pain , swelling in throat.    SUBJECTIVE / OVERNIGHT EVENTS: Pt reports worsening rash on chest/abdomen/thighs .This AM, pt reports throat discomfort this AM  Itching present. pt s/p IR guided Bx on friday     MEDICATIONS  (STANDING):  famotidine    Tablet 20 milliGRAM(s) Oral two times a day  lactobacillus acidophilus 1 Tablet(s) Oral daily  potassium acid phosphate/sodium acid phosphate tablet (K-PHOS No. 2) 1 Tablet(s) Oral four times a day with meals    MEDICATIONS  (PRN):  ondansetron Injectable 4 milliGRAM(s) IV Push every 8 hours PRN Nausea and/or Vomiting  ALBUTerol    90 MICROgram(s) HFA Inhaler 2 Puff(s) Inhalation every 4 hours PRN Shortness of Breath and/or Wheezing  ALBUTerol/ipratropium for Nebulization 3 milliLiter(s) Nebulizer every 6 hours PRN Shortness of Breath and/or Wheezing  acetaminophen   Tablet. 650 milliGRAM(s) Oral every 6 hours PRN Mild Pain (1 - 3)  diphenhydrAMINE   Capsule 50 milliGRAM(s) Oral every 4 hours PRN Rash and/or Itching  triamcinolone 0.1% Ointment 1 Application(s) Topical two times a day PRN itching    Vital Signs Last 24 Hrs  T(C): 36.8 (29 Jul 2017 04:52), Max: 36.9 (28 Jul 2017 12:33)  T(F): 98.2 (29 Jul 2017 04:52), Max: 98.5 (28 Jul 2017 12:33)  HR: 68 (29 Jul 2017 04:52) (68 - 77)  BP: 134/84 (29 Jul 2017 04:52) (109/66 - 134/84)  BP(mean): --  RR: 18 (29 Jul 2017 04:52) (16 - 18)  SpO2: 100% (29 Jul 2017 04:52) (100% - 100%)    CAPILLARY BLOOD GLUCOSE        I&O's Summary      PHYSICAL EXAM:  GENERAL: NAD, well-developed  HEAD:  presence of submandibular swelling, firm, tender, mobile.  Oral cavity - no signs of oral infection   EYES: EOMI, PERRLA, conjunctiva and sclera clear  NECK: Supple, No JVD  CHEST/LUNG: Clear to auscultation bilaterally; No wheeze  HEART: Regular rate and rhythm; No murmurs, rubs, or gallops  ABDOMEN: Soft, Nontender, Nondistended; Bowel sounds present  EXTREMITIES:  2+ Peripheral Pulses, No clubbing, cyanosis, or edema  PSYCH: AAOx3  NEUROLOGY: non-focal  SKIN: No rashes or lesions    LABS:                                        12.5   10.22 )-----------( 217      ( 29 Jul 2017 06:05 )             40.0     07-29    140  |  104  |  10  ----------------------------<  91  4.2   |  23  |  0.77    Ca    8.7      29 Jul 2017 06:05    TPro  7.1  /  Alb  3.6  /  TBili  0.2  /  DBili  x   /  AST  20  /  ALT  14  /  AlkPhos  76  07-29          RADIOLOGY & ADDITIONAL TESTS:    Imaging Personally Reviewed:CT of Neck Soft Tissue w/ IC reflects "Redemonstration of acute right submandibular gland sialoadenitis.  Extensive surrounding inflammatory change appears grossly unchanged compared to prior study.  No abscess collection is seen. No radiopaque calculi or ductal dilatation.  Extensive right-sided cervical adenopathy which has increased from the prior exam. Considerations include a viral etiology or lymphoproliferative disorder."  Diagnosed with Sialadenitis.      Consultant(s) Notes Reviewed:      Care Discussed with Consultants/Other Providers:ENT      Assessment and Plan:   27 year old female, with past history significant for Asthma, Lymphadenopathy, Fine needle aspiration, presented to the ED secondary to generalized rash and right face/neck swelling.   Admitted for further management of Sialadenitis, Lymphadenopathy    1. Dermatitis.    Plan: diffuse maculopapular rash likely cw Drug rash , rash is getting worse , will increase solumedrol  2. Sialadenitis.    Plan: Recurrent since May 2017after FNA of lymph node, but cyst-like mass first noted submandibular area in 01/2017. Reviewed  CT scan of soft tissues of the neck  - study for Fiorella-Barr nuclear antigen and IgG positive, CMV PCR negative, Hepatitis C virus RNA by PCR negative, Toxoplasma DNA negative, HIV negative  Not resolved with augmentin therapy twice, allergic reaction to recent clindamycin therapy- now started on unasyn 3 grams; continued 1.5 grams Q6H  On solu-medrol 40 mg Q8H x 3 doses  pepcid daily, zofran   DW  ENT - recommend core biopsy by IR.  s/p IR guided biopsy of LN on 7/27.Gram stain - no organism seen   ID consult appreciated     3. Lymphadenopathy.    Plan: Await IR guided biospy  FNA study performed in 05/2017 - reactive   - follow for improvement/resolution on antibiotic, steroid.     4 Prediabetes.    Plan: glucose = 116, hemoglobin A1c = 5.8 (07/16/2017)  Nutrition counseling.- consistent carb diet.     5 Abnormal urine findings : asymptomatic    6.Uncomplicated asthma, unspecified asthma severity.   inhalers prn     GI and DVT prophylasix

## 2017-07-29 NOTE — PROGRESS NOTE ADULT - ATTENDING COMMENTS
Luis M Baldwin  Attending Physician   Division of Infectious Disease  Pager #[phone]  After 5pm/weekend #[phone]

## 2017-07-30 VITALS
HEART RATE: 65 BPM | TEMPERATURE: 99 F | SYSTOLIC BLOOD PRESSURE: 133 MMHG | OXYGEN SATURATION: 100 % | RESPIRATION RATE: 18 BRPM | DIASTOLIC BLOOD PRESSURE: 85 MMHG

## 2017-07-30 LAB
ALBUMIN SERPL ELPH-MCNC: 3.7 G/DL — SIGNIFICANT CHANGE UP (ref 3.3–5)
ALP SERPL-CCNC: 78 U/L — SIGNIFICANT CHANGE UP (ref 40–120)
ALT FLD-CCNC: 34 U/L — HIGH (ref 4–33)
AST SERPL-CCNC: 29 U/L — SIGNIFICANT CHANGE UP (ref 4–32)
BASOPHILS # BLD AUTO: 0.02 K/UL — SIGNIFICANT CHANGE UP (ref 0–0.2)
BASOPHILS NFR BLD AUTO: 0.3 % — SIGNIFICANT CHANGE UP (ref 0–2)
BILIRUB SERPL-MCNC: 0.2 MG/DL — SIGNIFICANT CHANGE UP (ref 0.2–1.2)
BUN SERPL-MCNC: 12 MG/DL — SIGNIFICANT CHANGE UP (ref 7–23)
CALCIUM SERPL-MCNC: 8.9 MG/DL — SIGNIFICANT CHANGE UP (ref 8.4–10.5)
CHLORIDE SERPL-SCNC: 103 MMOL/L — SIGNIFICANT CHANGE UP (ref 98–107)
CO2 SERPL-SCNC: 24 MMOL/L — SIGNIFICANT CHANGE UP (ref 22–31)
CREAT SERPL-MCNC: 0.82 MG/DL — SIGNIFICANT CHANGE UP (ref 0.5–1.3)
EOSINOPHIL # BLD AUTO: 0.09 K/UL — SIGNIFICANT CHANGE UP (ref 0–0.5)
EOSINOPHIL NFR BLD AUTO: 1.3 % — SIGNIFICANT CHANGE UP (ref 0–6)
GLUCOSE SERPL-MCNC: 97 MG/DL — SIGNIFICANT CHANGE UP (ref 70–99)
HCT VFR BLD CALC: 39.1 % — SIGNIFICANT CHANGE UP (ref 34.5–45)
HGB BLD-MCNC: 12.7 G/DL — SIGNIFICANT CHANGE UP (ref 11.5–15.5)
IMM GRANULOCYTES # BLD AUTO: 0.04 # — SIGNIFICANT CHANGE UP
IMM GRANULOCYTES NFR BLD AUTO: 0.6 % — SIGNIFICANT CHANGE UP (ref 0–1.5)
LYMPHOCYTES # BLD AUTO: 0.92 K/UL — LOW (ref 1–3.3)
LYMPHOCYTES # BLD AUTO: 12.9 % — LOW (ref 13–44)
MCHC RBC-ENTMCNC: 27.6 PG — SIGNIFICANT CHANGE UP (ref 27–34)
MCHC RBC-ENTMCNC: 32.5 % — SIGNIFICANT CHANGE UP (ref 32–36)
MCV RBC AUTO: 85 FL — SIGNIFICANT CHANGE UP (ref 80–100)
MONOCYTES # BLD AUTO: 0.61 K/UL — SIGNIFICANT CHANGE UP (ref 0–0.9)
MONOCYTES NFR BLD AUTO: 8.6 % — SIGNIFICANT CHANGE UP (ref 2–14)
NEUTROPHILS # BLD AUTO: 5.43 K/UL — SIGNIFICANT CHANGE UP (ref 1.8–7.4)
NEUTROPHILS NFR BLD AUTO: 76.3 % — SIGNIFICANT CHANGE UP (ref 43–77)
NRBC # FLD: 0 — SIGNIFICANT CHANGE UP
PLATELET # BLD AUTO: 216 K/UL — SIGNIFICANT CHANGE UP (ref 150–400)
PMV BLD: 10.7 FL — SIGNIFICANT CHANGE UP (ref 7–13)
POTASSIUM SERPL-MCNC: 4.4 MMOL/L — SIGNIFICANT CHANGE UP (ref 3.5–5.3)
POTASSIUM SERPL-SCNC: 4.4 MMOL/L — SIGNIFICANT CHANGE UP (ref 3.5–5.3)
PROT SERPL-MCNC: 7 G/DL — SIGNIFICANT CHANGE UP (ref 6–8.3)
RBC # BLD: 4.6 M/UL — SIGNIFICANT CHANGE UP (ref 3.8–5.2)
RBC # FLD: 13.4 % — SIGNIFICANT CHANGE UP (ref 10.3–14.5)
SODIUM SERPL-SCNC: 141 MMOL/L — SIGNIFICANT CHANGE UP (ref 135–145)
WBC # BLD: 7.11 K/UL — SIGNIFICANT CHANGE UP (ref 3.8–10.5)
WBC # FLD AUTO: 7.11 K/UL — SIGNIFICANT CHANGE UP (ref 3.8–10.5)

## 2017-07-30 PROCEDURE — 99239 HOSP IP/OBS DSCHRG MGMT >30: CPT

## 2017-07-30 RX ORDER — ALBUTEROL 90 UG/1
0 AEROSOL, METERED ORAL
Qty: 0 | Refills: 0 | COMMUNITY

## 2017-07-30 RX ORDER — LACTOBACILLUS ACIDOPHILUS 100MM CELL
1 CAPSULE ORAL
Qty: 7 | Refills: 0
Start: 2017-07-30 | End: 2017-08-06

## 2017-07-30 RX ORDER — DIPHENHYDRAMINE HCL 50 MG
1 CAPSULE ORAL
Qty: 30 | Refills: 0
Start: 2017-07-30 | End: 2017-08-04

## 2017-07-30 RX ORDER — GARLIC 1000 MG
0 CAPSULE ORAL
Qty: 0 | Refills: 0 | COMMUNITY

## 2017-07-30 RX ORDER — IPRATROPIUM/ALBUTEROL SULFATE 18-103MCG
0 AEROSOL WITH ADAPTER (GRAM) INHALATION
Qty: 0 | Refills: 0 | COMMUNITY

## 2017-07-30 RX ADMIN — Medication 1 TABLET(S): at 14:09

## 2017-07-30 RX ADMIN — Medication 650 MILLIGRAM(S): at 16:13

## 2017-07-30 RX ADMIN — Medication 50 MILLIGRAM(S): at 08:39

## 2017-07-30 RX ADMIN — Medication 1 TABLET(S): at 08:40

## 2017-07-30 RX ADMIN — Medication 1 APPLICATION(S): at 00:44

## 2017-07-30 RX ADMIN — Medication 50 MILLIGRAM(S): at 16:13

## 2017-07-30 NOTE — PROGRESS NOTE ADULT - ASSESSMENT
27 year old female, with past history significant for Asthma, Lymphadenopathy, Fine needle aspiration, presented to the ED secondary to generalized rash and right face/neck swelling.   Admitted for further management of Sialadenitis, Lymphadenopathy
27 year old female, with past history significant for Asthma, Lymphadenopathy, Fine needle aspiration, presented to the ED secondary to generalized rash and right face/neck swelling.   Admitted for further management of Sialadenitis, Lymphadenopathy
Assessment and Plan:   27 year old female, with past history significant for Asthma, Lymphadenopathy, Fine needle aspiration, presented to the ED secondary to generalized rash and right face/neck swelling.   Admitted for further management of Sialadenitis, Lymphadenopathy    1. Dermatitis.    Plan: diffuse maculopapular rash likely cw Drug rash , rash is getting worse , change to PO prdenisoen    2. Sialadenitis.    Plan: stable     3. Lymphadenopathy.    Plan  f/u biospsy report    4 Prediabetes.    Plan: glucose = 116, hemoglobin A1c = 5.8 (07/16/2017)  Nutrition counseling.- consistent carb diet.     5 Abnormal urine findings : asymptomatic    6.Uncomplicated asthma, unspecified asthma severity.   inhalers prn       spoke to patient she will f/u with her doctors as out patient .   f/u PCP / [facility] as out patient     DC home today
26 y/o F, with past history significant for Asthma, Lymphadenopathy, Fine needle aspiration, presented to the ED secondary to generalized rash and right face/neck swelling. Admitted for further management of Sialadenitis, and Lymphadenopathy.

## 2017-07-30 NOTE — PROGRESS NOTE ADULT - SUBJECTIVE AND OBJECTIVE BOX
Patient is a 27y old  Female who presents with a chief complaint of c/o pain , swelling in throat.    SUBJECTIVE / OVERNIGHT EVENTS: Pt reports worsening rash on chest/abdomen/thighs .This AM, pt reports throat discomfort this AM  Itching present. pt s/p IR guided Bx on friday     MEDICATIONS  (STANDING):  famotidine    Tablet 20 milliGRAM(s) Oral two times a day  lactobacillus acidophilus 1 Tablet(s) Oral daily  potassium acid phosphate/sodium acid phosphate tablet (K-PHOS No. 2) 1 Tablet(s) Oral four times a day with meals    MEDICATIONS  (PRN):  ondansetron Injectable 4 milliGRAM(s) IV Push every 8 hours PRN Nausea and/or Vomiting  ALBUTerol    90 MICROgram(s) HFA Inhaler 2 Puff(s) Inhalation every 4 hours PRN Shortness of Breath and/or Wheezing  ALBUTerol/ipratropium for Nebulization 3 milliLiter(s) Nebulizer every 6 hours PRN Shortness of Breath and/or Wheezing  acetaminophen   Tablet. 650 milliGRAM(s) Oral every 6 hours PRN Mild Pain (1 - 3)  diphenhydrAMINE   Capsule 50 milliGRAM(s) Oral every 4 hours PRN Rash and/or Itching  triamcinolone 0.1% Ointment 1 Application(s) Topical two times a day PRN itching    Vital Signs Last 24 Hrs  T(C): 36.8 (29 Jul 2017 04:52), Max: 36.9 (28 Jul 2017 12:33)  T(F): 98.2 (29 Jul 2017 04:52), Max: 98.5 (28 Jul 2017 12:33)  HR: 68 (29 Jul 2017 04:52) (68 - 77)  BP: 134/84 (29 Jul 2017 04:52) (109/66 - 134/84)  BP(mean): --  RR: 18 (29 Jul 2017 04:52) (16 - 18)  SpO2: 100% (29 Jul 2017 04:52) (100% - 100%)    CAPILLARY BLOOD GLUCOSE        I&O's Summary      PHYSICAL EXAM:  GENERAL: NAD, well-developed  HEAD:  presence of submandibular swelling, firm, tender, mobile.  Oral cavity - no signs of oral infection   EYES: EOMI, PERRLA, conjunctiva and sclera clear  NECK: Supple, No JVD  CHEST/LUNG: Clear to auscultation bilaterally; No wheeze  HEART: Regular rate and rhythm; No murmurs, rubs, or gallops  ABDOMEN: Soft, Nontender, Nondistended; Bowel sounds present  EXTREMITIES:  2+ Peripheral Pulses, No clubbing, cyanosis, or edema  PSYCH: AAOx3  NEUROLOGY: non-focal  SKIN: No rashes or lesions    LABS:                                        12.5   10.22 )-----------( 217      ( 29 Jul 2017 06:05 )             40.0     07-29    140  |  104  |  10  ----------------------------<  91  4.2   |  23  |  0.77    Ca    8.7      29 Jul 2017 06:05    TPro  7.1  /  Alb  3.6  /  TBili  0.2  /  DBili  x   /  AST  20  /  ALT  14  /  AlkPhos  76  07-29          RADIOLOGY & ADDITIONAL TESTS:    Imaging Personally Reviewed:CT of Neck Soft Tissue w/ IC reflects "Redemonstration of acute right submandibular gland sialoadenitis.  Extensive surrounding inflammatory change appears grossly unchanged compared to prior study.  No abscess collection is seen. No radiopaque calculi or ductal dilatation.  Extensive right-sided cervical adenopathy which has increased from the prior exam. Considerations include a viral etiology or lymphoproliferative disorder."  Diagnosed with Sialadenitis.      Consultant(s) Notes Reviewed:      Care Discussed with Consultants/Other Providers:ENT

## 2017-07-31 LAB
SPECIMEN SOURCE: SIGNIFICANT CHANGE UP
TM INTERPRETATION: SIGNIFICANT CHANGE UP

## 2017-08-02 LAB — CULTURE - SURGICAL SITE: SIGNIFICANT CHANGE UP

## 2017-08-07 ENCOUNTER — APPOINTMENT (OUTPATIENT)
Dept: OTOLARYNGOLOGY | Facility: CLINIC | Age: 27
End: 2017-08-07

## 2017-08-07 LAB — NON-GYNECOLOGICAL CYTOLOGY STUDY: SIGNIFICANT CHANGE UP

## 2017-08-24 LAB — FUNGUS SPEC QL CULT: SIGNIFICANT CHANGE UP

## 2017-10-30 NOTE — H&P ADULT - PROBLEM SELECTOR PROBLEM 7
From: Sandra Morin  To: Yareli Álvarez MD  Sent: 10/30/2017 7:03 AM EDT  Subject: Medication Renewal Request    Original authorizing provider: MD Cam Stackion Basil would like a refill of the following medications:  fentaNYL (DURAGESIC) 25 mcg/hr PATCH Yareli Álvarez MD]    Preferred pharmacy: [facility] #[id number] [address]:
Nutrition, metabolism, and development symptoms

## 2018-07-28 PROBLEM — Z78.9 ALCOHOL USE: Status: ACTIVE | Noted: 2017-06-12

## 2018-11-14 NOTE — PROGRESS NOTE ADULT - PROBLEM SELECTOR PROBLEM 9
SUBJECTIVE:   Bart Blair is a 88 year old male who presents to clinic today for the following health issues:      Diabetes Follow-up    Patient is checking blood sugars: once daily.  Results are as follows:         am - 196, 178, 169, 168, 164, 201 and this morning it was 146. Patient said his numbers are higher since starting on his new medication, Tradjenta    Diabetic concerns: other - blood sugars running higher since started Tradjenta     Symptoms of hypoglycemia (low blood sugar): none     Paresthesias (numbness or burning in feet) or sores: Yes - from the knees down to his toes     Date of last diabetic eye exam: 2 weeks ago and goes regularly    Patient medication was changed about 1 month ago to Tradjenta and metformin was discontinued secondary to elevated creatinine. Patient home blood sugar are higher than before, his A1C 8.8 is also trending higher.     BP Readings from Last 2 Encounters:   11/14/18 128/58   10/29/18 128/66     Hemoglobin A1C (%)   Date Value   11/14/2018 8.8 (H)   10/09/2018 8.3 (H)     LDL Cholesterol Calculated (mg/dL)   Date Value   10/09/2018 21   10/24/2017 32       Diabetes Management Resources      Amount of exercise or physical activity:     Problems taking medications regularly: No    Medication side effects: none    Diet: regular (no restrictions)              Problem list and histories reviewed & adjusted, as indicated.  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)     Varicose vein     Neck pain      Implantable loop recorder, [employer]     Advance Care Planning     Temporal arteritis (H)     Type 2 diabetes mellitus with hyperglycemia, without long-term current use of insulin (H)     Alcoholism in remission (H)     Past Surgical History:   Procedure Laterality Date     ARTHROPLASTY KNEE  1/31/2012    Procedure:ARTHROPLASTY KNEE; LEFT TOTAL KNEE ARTHROPLASTY (IRASEMA)^; Surgeon:SKIP FAIR; Location: OR     ARTHROSCOPY SHOULDER, OPEN ROTATOR CUFF REPAIR, COMBINED  4/24/2012    Procedure:COMBINED ARTHROSCOPY SHOULDER, OPEN ROTATOR CUFF REPAIR; RIGHT SHOULDER ARTHROSCOPY OPEN ROTATOR CUFF DEBRIDEMENT, SUBCROMIAL DECOMPRESSION, AND BICEPS TENODESIS REPAIR; Surgeon:SKIP FAIR; Location: [facility]     [facility] AFF SURGICAL PATHOLOGY  6-    Dr. Bowers; left hand     ENT SURGERY       INCISE FINGER TENDON SHEATH  2008    Dr. Bowers; right AND LEFT hand     THROAT SURGERY      vocal cord polyps       Social History   Substance Use Topics     Smoking status: Never Smoker     Smokeless tobacco: Never Used     Alcohol use Yes      Comment: couple beers everyday     Family History   Problem Relation Age of Onset     Family History Negative Other      unknown family history per patient         Current Outpatient Prescriptions   Medication Sig Dispense Refill     acetaminophen (TYLENOL) 500 MG tablet Take 2 tablets (1,000 mg) by mouth 3 times daily 100 tablet 0     amoxicillin (AMOXIL) 500 MG capsule 4 tablets prior to dental appointment. Use for dental appointments 16 capsule 4     aspirin 81 MG tablet Take 1 tablet by mouth daily.  3     atorvastatin (LIPITOR) 10 MG tablet Take 1 tablet (10 mg) by mouth daily Refill when requested 90 tablet 3     blood glucose monitoring (ACCU-CHEK COMPACT STRIPS) test strip 1 strip by In Vitro route daily 100 each 11     blood glucose monitoring (SOFTCLIX) lancets Check blood sugar once daily 100 each 3     cilostazol (PLETAL) 50 MG tablet Take 1 tablet (50 mg) by mouth 2 times  daily 180 tablet 3     Cyanocobalamin (VITAMIN B-12 CR) 1000 MCG TBCR TAKE ONE TABLET BY MOUTH EVERY DAY 60 tablet 4     diclofenac (VOLTAREN) 1 % GEL topical gel APPLY 4 GRAMS TO KNEES OR 2 GRAMS TO HANDS FOUR TIMES A DAY USING ENCLOSED DOSING CARD 300 g 3     doxycycline Monohydrate 100 MG CAPS 1 tablet 2 time daily for Rosacea flares 60 capsule 3     fluticasone (FLONASE) 50 MCG/ACT spray Spray 1-2 sprays into both nostrils daily 16 g 5     FOLIC ACID PO Take 1 mg by mouth daily       furosemide (LASIX) 20 MG tablet 1/2 tablet per day. If weight increases by 3-5 pounds then increase to 20 mg (1 tablet). If weight decreases to 205 then ok to hold. 90 tablet 3     irbesartan (AVAPRO) 75 MG tablet Take 1 tablet (75 mg) by mouth daily       linagliptin (TRADJENTA) 5 MG TABS tablet Take 1 tablet (5 mg) by mouth daily 30 tablet 3     metroNIDAZOLE (METROCREAM) 0.75 % cream Apply topically 2 times daily 45 g 3     Multiple Vitamin (MULTI VITAMIN  MENS) TABS Take  by mouth. Takes one daily         PREDNISONE PO Take 5 mg by mouth Currently taking 4 tablets (20mg) daily per patient (this medication is being taken care of through [facility]).       senna-docusate (SENOKOT-S;PERICOLACE) 8.6-50 MG per tablet Take 1-2 tablets by mouth Takes about 2-3 times weekly       sertraline (ZOLOFT) 25 MG tablet TAKE ONE TABLET BY MOUTH EVERY DAY 90 tablet 3     VITAMIN D 1000 UNIT OR TABS 1 TABLET DAILY 100 4     Allergies   Allergen Reactions     No Known Drug Allergies      Recent Labs   Lab Test  11/14/18   1400  10/09/18   1031  05/29/18   1310   12/22/17   1830  10/24/17   1050   11/22/16   0948   11/18/14   1238   10/14/13   1508   A1C  8.8*  8.3*  8.5*   < >   --   6.6*   < >  6.3*   < >  6.0   < >   --    LDL   --   21   --    --    --   32   --    --    --   37   --    --    HDL   --   49   --    --    --   75   --    --    --   45   --    --    TRIG   --   287*   --    --    --   226*   --    --    --   171*   --    --   "  ALT   --    --    --    --   16   --    --    --    --   10   --   14   CR   --   1.55*  1.23   < >  1.13  1.29*   --    --    < >  1.31*   < >   --    GFRESTIMATED   --   43*  56*   < >  61  53*   --    --    < >  52*   < >   --    GFRESTBLACK   --   52*  67   < >  74  64   --    --    < >  63   < >   --    POTASSIUM   --   5.1  5.5*   < >  5.1  4.9   --    --    < >  4.5   < >   --    TSH   --    --    --    --    --    --    --   1.73   --   2.42   --    --     < > = values in this interval not displayed.      BP Readings from Last 3 Encounters:   11/14/18 128/58   10/29/18 128/66   10/09/18 130/64    Wt Readings from Last 3 Encounters:   11/14/18 94.3 kg (208 lb)   10/29/18 96.2 kg (212 lb)   10/09/18 93 kg (205 lb)                  Labs reviewed in EPIC    Reviewed and updated as needed this visit by clinical staff  Tobacco  Allergies  Meds  Med Hx  Surg Hx  Fam Hx  Soc Hx      Reviewed and updated as needed this visit by Provider         ROS:  Constitutional, HEENT, cardiovascular, pulmonary, gi and gu systems are negative, except as otherwise noted.    This document serves as a record of the services and decisions personally performed by HIRO MARQUEZ. It was created on his/her behalf by Trish Lo, a trained medical scribe. The creation of this document is based on the provider's statements to the medical scribe. Trish Lo, November 14, 2018 1:49 PM      OBJECTIVE:     /58 (BP Location: Right arm, Cuff Size: Adult Regular)  Pulse 108  Temp 97.9  F (36.6  C) (Oral)  Ht 1.727 m (5' 8\")  Wt 94.3 kg (208 lb)  BMI 31.63 kg/m2  Body mass index is 31.63 kg/(m^2).  GENERAL: healthy, alert and no distress  RESP: lungs clear to auscultation - no rales, rhonchi or wheezes  CV: regular rate and rhythm, normal S1 S2, no S3 or S4, no murmur, click or rub, no peripheral edema and peripheral pulses strong  PSYCH: mentation appears normal, affect normal/bright    Diagnostic Test Results:  Results " for orders placed or performed in visit on 11/14/18 (from the past 24 hour(s))   Hemoglobin A1c   Result Value Ref Range    Hemoglobin A1C 8.8 (H) 0 - 5.6 %       ASSESSMENT/PLAN:     (E11.65) Type 2 diabetes mellitus with hyperglycemia, without long-term current use of insulin (H)  (primary encounter diagnosis)  Comment: A1C increased.   Plan: TSH WITH FREE T4 REFLEX, Hemoglobin A1c, Basic         metabolic panel, glipiZIDE (GLUCOTROL) 5 MG         tablet        I prescribed the patient glipizide in addition to his Tadjenta. I advised him to continue to check his BS at home. If values are consistently under 100, will discontinue tradjenta. Advised taking glipizide with food.    (R74.8) Elevated creatine kinase  Comment: Patient has history of elevated creatinine.   Plan: Basic metabolic panel        Metformin was discontinued. Recheck today.     There are no Patient Instructions on file for this visit.    Chuy Stewart MD, MD  [facility]  The information in this document, created by the medical scribe Trish Lo for me, accurately reflects the services I personally performed and the decisions made by me. I have reviewed and approved this document for accuracy prior to leaving the patient care area.     Prophylactic measure

## 2019-01-15 ENCOUNTER — EMERGENCY (EMERGENCY)
Facility: HOSPITAL | Age: 29
LOS: 1 days | Discharge: HOME | End: 2019-01-15
Attending: EMERGENCY MEDICINE | Admitting: EMERGENCY MEDICINE
Payer: COMMERCIAL

## 2019-01-15 VITALS
DIASTOLIC BLOOD PRESSURE: 76 MMHG | TEMPERATURE: 98 F | HEART RATE: 74 BPM | RESPIRATION RATE: 18 BRPM | SYSTOLIC BLOOD PRESSURE: 142 MMHG | OXYGEN SATURATION: 98 %

## 2019-01-15 VITALS
DIASTOLIC BLOOD PRESSURE: 78 MMHG | HEART RATE: 79 BPM | HEIGHT: 70 IN | WEIGHT: 289.03 LBS | OXYGEN SATURATION: 98 % | TEMPERATURE: 98 F | SYSTOLIC BLOOD PRESSURE: 136 MMHG | RESPIRATION RATE: 20 BRPM

## 2019-01-15 DIAGNOSIS — Z98.890 OTHER SPECIFIED POSTPROCEDURAL STATES: Chronic | ICD-10-CM

## 2019-01-15 LAB
ALBUMIN SERPL ELPH-MCNC: 3.5 G/DL — SIGNIFICANT CHANGE UP (ref 3.3–5)
ALP SERPL-CCNC: 104 U/L — SIGNIFICANT CHANGE UP (ref 40–120)
ALT FLD-CCNC: 25 U/L — SIGNIFICANT CHANGE UP (ref 12–78)
ANION GAP SERPL CALC-SCNC: 6 MMOL/L — SIGNIFICANT CHANGE UP (ref 5–17)
AST SERPL-CCNC: 30 U/L — SIGNIFICANT CHANGE UP (ref 15–37)
BASOPHILS # BLD AUTO: 0.03 K/UL — SIGNIFICANT CHANGE UP (ref 0–0.2)
BASOPHILS NFR BLD AUTO: 0.7 % — SIGNIFICANT CHANGE UP (ref 0–2)
BILIRUB SERPL-MCNC: 0.3 MG/DL — SIGNIFICANT CHANGE UP (ref 0.2–1.2)
BUN SERPL-MCNC: 8 MG/DL — SIGNIFICANT CHANGE UP (ref 7–23)
CALCIUM SERPL-MCNC: 8.3 MG/DL — LOW (ref 8.5–10.1)
CHLORIDE SERPL-SCNC: 107 MMOL/L — SIGNIFICANT CHANGE UP (ref 96–108)
CO2 SERPL-SCNC: 28 MMOL/L — SIGNIFICANT CHANGE UP (ref 22–31)
CREAT SERPL-MCNC: 0.89 MG/DL — SIGNIFICANT CHANGE UP (ref 0.5–1.3)
EOSINOPHIL # BLD AUTO: 0.15 K/UL — SIGNIFICANT CHANGE UP (ref 0–0.5)
EOSINOPHIL NFR BLD AUTO: 3.4 % — SIGNIFICANT CHANGE UP (ref 0–6)
GLUCOSE SERPL-MCNC: 100 MG/DL — HIGH (ref 70–99)
HCT VFR BLD CALC: 39.5 % — SIGNIFICANT CHANGE UP (ref 34.5–45)
HGB BLD-MCNC: 12.6 G/DL — SIGNIFICANT CHANGE UP (ref 11.5–15.5)
IMM GRANULOCYTES NFR BLD AUTO: 0.4 % — SIGNIFICANT CHANGE UP (ref 0–1.5)
LYMPHOCYTES # BLD AUTO: 0.94 K/UL — LOW (ref 1–3.3)
LYMPHOCYTES # BLD AUTO: 21.1 % — SIGNIFICANT CHANGE UP (ref 13–44)
MCHC RBC-ENTMCNC: 27.5 PG — SIGNIFICANT CHANGE UP (ref 27–34)
MCHC RBC-ENTMCNC: 31.9 GM/DL — LOW (ref 32–36)
MCV RBC AUTO: 86.2 FL — SIGNIFICANT CHANGE UP (ref 80–100)
MONOCYTES # BLD AUTO: 0.65 K/UL — SIGNIFICANT CHANGE UP (ref 0–0.9)
MONOCYTES NFR BLD AUTO: 14.6 % — HIGH (ref 2–14)
NEUTROPHILS # BLD AUTO: 2.66 K/UL — SIGNIFICANT CHANGE UP (ref 1.8–7.4)
NEUTROPHILS NFR BLD AUTO: 59.8 % — SIGNIFICANT CHANGE UP (ref 43–77)
NRBC # BLD: 0 /100 WBCS — SIGNIFICANT CHANGE UP (ref 0–0)
PLATELET # BLD AUTO: 223 K/UL — SIGNIFICANT CHANGE UP (ref 150–400)
POTASSIUM SERPL-MCNC: 4.3 MMOL/L — SIGNIFICANT CHANGE UP (ref 3.5–5.3)
POTASSIUM SERPL-SCNC: 4.3 MMOL/L — SIGNIFICANT CHANGE UP (ref 3.5–5.3)
PROT SERPL-MCNC: 7.5 G/DL — SIGNIFICANT CHANGE UP (ref 6–8.3)
RBC # BLD: 4.58 M/UL — SIGNIFICANT CHANGE UP (ref 3.8–5.2)
RBC # FLD: 13.3 % — SIGNIFICANT CHANGE UP (ref 10.3–14.5)
SODIUM SERPL-SCNC: 141 MMOL/L — SIGNIFICANT CHANGE UP (ref 135–145)
WBC # BLD: 4.45 K/UL — SIGNIFICANT CHANGE UP (ref 3.8–10.5)
WBC # FLD AUTO: 4.45 K/UL — SIGNIFICANT CHANGE UP (ref 3.8–10.5)

## 2019-01-15 PROCEDURE — 85027 COMPLETE CBC AUTOMATED: CPT

## 2019-01-15 PROCEDURE — 80053 COMPREHEN METABOLIC PANEL: CPT

## 2019-01-15 PROCEDURE — 99284 EMERGENCY DEPT VISIT MOD MDM: CPT | Mod: 25

## 2019-01-15 PROCEDURE — 99284 EMERGENCY DEPT VISIT MOD MDM: CPT

## 2019-01-15 PROCEDURE — 70491 CT SOFT TISSUE NECK W/DYE: CPT

## 2019-01-15 PROCEDURE — 36415 COLL VENOUS BLD VENIPUNCTURE: CPT

## 2019-01-15 PROCEDURE — 96375 TX/PRO/DX INJ NEW DRUG ADDON: CPT

## 2019-01-15 PROCEDURE — 70491 CT SOFT TISSUE NECK W/DYE: CPT | Mod: 26

## 2019-01-15 PROCEDURE — 96374 THER/PROPH/DIAG INJ IV PUSH: CPT | Mod: XU

## 2019-01-15 RX ORDER — AMPICILLIN SODIUM AND SULBACTAM SODIUM 250; 125 MG/ML; MG/ML
3 INJECTION, POWDER, FOR SUSPENSION INTRAMUSCULAR; INTRAVENOUS ONCE
Qty: 0 | Refills: 0 | Status: COMPLETED | OUTPATIENT
Start: 2019-01-15 | End: 2019-01-15

## 2019-01-15 RX ORDER — SODIUM CHLORIDE 9 MG/ML
1000 INJECTION INTRAMUSCULAR; INTRAVENOUS; SUBCUTANEOUS ONCE
Qty: 0 | Refills: 0 | Status: COMPLETED | OUTPATIENT
Start: 2019-01-15 | End: 2019-01-15

## 2019-01-15 RX ORDER — KETOROLAC TROMETHAMINE 30 MG/ML
30 SYRINGE (ML) INJECTION ONCE
Qty: 0 | Refills: 0 | Status: DISCONTINUED | OUTPATIENT
Start: 2019-01-15 | End: 2019-01-15

## 2019-01-15 RX ADMIN — Medication 30 MILLIGRAM(S): at 05:42

## 2019-01-15 RX ADMIN — SODIUM CHLORIDE 1000 MILLILITER(S): 9 INJECTION INTRAMUSCULAR; INTRAVENOUS; SUBCUTANEOUS at 05:42

## 2019-01-15 RX ADMIN — AMPICILLIN SODIUM AND SULBACTAM SODIUM 200 GRAM(S): 250; 125 INJECTION, POWDER, FOR SUSPENSION INTRAMUSCULAR; INTRAVENOUS at 07:55

## 2019-01-15 RX ADMIN — Medication 30 MILLIGRAM(S): at 06:06

## 2019-01-15 RX ADMIN — SODIUM CHLORIDE 1000 MILLILITER(S): 9 INJECTION INTRAMUSCULAR; INTRAVENOUS; SUBCUTANEOUS at 06:06

## 2019-01-15 NOTE — ED PROVIDER NOTE - OBJECTIVE STATEMENT
29yo female who presents with right sided neck swelling for 3 weeks. pt states she has been seen by multiple physicians and had it biopsied and they cant find any diagnosis, and now over the last few weeks the pain and swelling is worse, with sore throat, no drooling, no cough, fever, trismus

## 2019-01-15 NOTE — ED ADULT NURSE NOTE - OBJECTIVE STATEMENT
Patient complaining of neck pain started 3 weeks ago was on antibiotic but wasn't helping seen and evaluated by MD with orders made and carried out.

## 2019-01-15 NOTE — ED ADULT NURSE REASSESSMENT NOTE - NS ED NURSE REASSESS COMMENT FT1
Patient brought to CT scan at this time.
Patient stated LMP 2 weeks ago.
Patient brought back from CT scan awaiting result.

## 2019-01-15 NOTE — ED ADULT NURSE NOTE - NSIMPLEMENTINTERV_GEN_ALL_ED
Implemented All Universal Safety Interventions:  [place] to call system. Call bell, personal items and telephone within reach. Instruct patient to call for assistance. Room bathroom lighting operational. Non-slip footwear when patient is off stretcher. Physically safe environment: no spills, clutter or unnecessary equipment. Stretcher in lowest position, wheels locked, appropriate side rails in place.

## 2019-01-15 NOTE — ED PROVIDER NOTE - PROGRESS NOTE DETAILS
spoke with pt, results reviewed with pt, pt to be d/c home follow up with ent, d/c with abx, and return if any symptoms worsen

## 2019-01-15 NOTE — ED ADULT TRIAGE NOTE - CHIEF COMPLAINT QUOTE
" swollen neck and pain for a couple of weeks, the doctor placed me on antibiotic but it's not helping"

## 2019-01-16 PROBLEM — J45.909 UNSPECIFIED ASTHMA, UNCOMPLICATED: Chronic | Status: ACTIVE | Noted: 2017-07-16

## 2019-01-16 PROBLEM — R59.1 GENERALIZED ENLARGED LYMPH NODES: Chronic | Status: ACTIVE | Noted: 2017-07-16

## 2019-01-18 PROBLEM — J45.909 UNSPECIFIED ASTHMA, UNCOMPLICATED: Chronic | Status: ACTIVE | Noted: 2019-01-15

## 2019-01-18 PROBLEM — L93.0 DISCOID LUPUS ERYTHEMATOSUS: Chronic | Status: ACTIVE | Noted: 2019-01-15

## 2019-01-25 ENCOUNTER — APPOINTMENT (OUTPATIENT)
Dept: OTOLARYNGOLOGY | Facility: CLINIC | Age: 29
End: 2019-01-25
Payer: COMMERCIAL

## 2019-01-25 VITALS
SYSTOLIC BLOOD PRESSURE: 116 MMHG | WEIGHT: 289 LBS | OXYGEN SATURATION: 98 % | TEMPERATURE: 98.1 F | BODY MASS INDEX: 41.37 KG/M2 | HEIGHT: 70 IN | DIASTOLIC BLOOD PRESSURE: 72 MMHG | HEART RATE: 85 BPM

## 2019-01-25 DIAGNOSIS — Z87.39 PERSONAL HISTORY OF OTHER DISEASES OF THE MUSCULOSKELETAL SYSTEM AND CONNECTIVE TISSUE: ICD-10-CM

## 2019-01-25 PROCEDURE — 31575 DIAGNOSTIC LARYNGOSCOPY: CPT

## 2019-01-25 PROCEDURE — 99214 OFFICE O/P EST MOD 30 MIN: CPT | Mod: 25

## 2019-01-25 RX ORDER — IBUPROFEN 200 MG/1
200 TABLET ORAL
Refills: 0 | Status: ACTIVE | COMMUNITY

## 2019-01-25 RX ORDER — AMOXICILLIN AND CLAVULANATE POTASSIUM 875; 125 MG/1; 1/1
875-125 TABLET, FILM COATED ORAL
Refills: 0 | Status: ACTIVE | COMMUNITY

## 2019-01-25 NOTE — DATA REVIEWED
[de-identified] : CT Neck with enlarged LAD in the right neck through out and also along the superior mediastinum.

## 2019-01-25 NOTE — PROCEDURE
[Topical Lidocaine] : topical lidocaine [Oxymetazoline HCl] : oxymetazoline HCl [Flexible Endoscope] : examined with the flexible endoscope [Serial Number: ___] : Serial Number: [unfilled] [Macroglossia] : macroglossia preventing mirror examination [de-identified] : No lesions in the NPx, OPx, HPx or larynx.  VC are mobile, airway patent.\par

## 2019-01-25 NOTE — PHYSICAL EXAM
[de-identified] : Patient with palpable LAD throughout the right neck, levels I-V, largest in level III, approx. 4 cm, mobile, no TTP. [Midline] : trachea located in midline position [Laryngoscopy Performed] : laryngoscopy was performed, see procedure section for findings [Normal] : no rashes

## 2019-01-30 ENCOUNTER — TRANSCRIPTION ENCOUNTER (OUTPATIENT)
Age: 29
End: 2019-01-30

## 2019-01-31 ENCOUNTER — RESULT REVIEW (OUTPATIENT)
Age: 29
End: 2019-01-31

## 2019-01-31 ENCOUNTER — APPOINTMENT (OUTPATIENT)
Dept: OTOLARYNGOLOGY | Facility: HOSPITAL | Age: 29
End: 2019-01-31

## 2019-01-31 ENCOUNTER — OUTPATIENT (OUTPATIENT)
Dept: OUTPATIENT SERVICES | Facility: HOSPITAL | Age: 29
LOS: 1 days | End: 2019-01-31
Payer: COMMERCIAL

## 2019-01-31 ENCOUNTER — OUTPATIENT (OUTPATIENT)
Dept: OUTPATIENT SERVICES | Facility: HOSPITAL | Age: 29
LOS: 1 days | Discharge: HOME | End: 2019-01-31
Payer: COMMERCIAL

## 2019-01-31 VITALS
DIASTOLIC BLOOD PRESSURE: 58 MMHG | SYSTOLIC BLOOD PRESSURE: 106 MMHG | OXYGEN SATURATION: 99 % | RESPIRATION RATE: 16 BRPM | HEART RATE: 65 BPM | TEMPERATURE: 98 F

## 2019-01-31 VITALS
DIASTOLIC BLOOD PRESSURE: 66 MMHG | SYSTOLIC BLOOD PRESSURE: 134 MMHG | TEMPERATURE: 98 F | HEIGHT: 70 IN | OXYGEN SATURATION: 100 % | RESPIRATION RATE: 16 BRPM | WEIGHT: 289.03 LBS | HEART RATE: 80 BPM

## 2019-01-31 DIAGNOSIS — Z98.890 OTHER SPECIFIED POSTPROCEDURAL STATES: Chronic | ICD-10-CM

## 2019-01-31 DIAGNOSIS — R59.0 LOCALIZED ENLARGED LYMPH NODES: ICD-10-CM

## 2019-01-31 LAB — HCG UR QL: NEGATIVE — SIGNIFICANT CHANGE UP

## 2019-01-31 PROCEDURE — 88280 CHROMOSOME KARYOTYPE STUDY: CPT

## 2019-01-31 PROCEDURE — 88264 CHROMOSOME ANALYSIS 20-25: CPT

## 2019-01-31 PROCEDURE — 88365 INSITU HYBRIDIZATION (FISH): CPT | Mod: 26,59

## 2019-01-31 PROCEDURE — 88307 TISSUE EXAM BY PATHOLOGIST: CPT | Mod: 26

## 2019-01-31 PROCEDURE — 88342 IMHCHEM/IMCYTCHM 1ST ANTB: CPT | Mod: 26,59

## 2019-01-31 PROCEDURE — 88360 TUMOR IMMUNOHISTOCHEM/MANUAL: CPT | Mod: 26

## 2019-01-31 PROCEDURE — 38510 BIOPSY/REMOVAL LYMPH NODES: CPT | Mod: GC,RT

## 2019-01-31 PROCEDURE — 88341 IMHCHEM/IMCYTCHM EA ADD ANTB: CPT | Mod: 26,59

## 2019-01-31 PROCEDURE — 88291 CYTO/MOLECULAR REPORT: CPT

## 2019-01-31 PROCEDURE — 88367 INSITU HYBRIDIZATION AUTO: CPT | Mod: 26

## 2019-01-31 PROCEDURE — 88237 TISSUE CULTURE BONE MARROW: CPT

## 2019-01-31 RX ORDER — SODIUM CHLORIDE 9 MG/ML
1000 INJECTION, SOLUTION INTRAVENOUS
Qty: 0 | Refills: 0 | Status: DISCONTINUED | OUTPATIENT
Start: 2019-01-31 | End: 2019-02-15

## 2019-01-31 RX ORDER — PANTOPRAZOLE SODIUM 20 MG/1
40 TABLET, DELAYED RELEASE ORAL ONCE
Qty: 0 | Refills: 0 | Status: COMPLETED | OUTPATIENT
Start: 2019-01-31 | End: 2019-01-31

## 2019-01-31 RX ORDER — FENTANYL CITRATE 50 UG/ML
25 INJECTION INTRAVENOUS
Qty: 0 | Refills: 0 | Status: DISCONTINUED | OUTPATIENT
Start: 2019-01-31 | End: 2019-01-31

## 2019-01-31 RX ORDER — CEPHALEXIN 500 MG
1 CAPSULE ORAL
Qty: 20 | Refills: 0
Start: 2019-01-31 | End: 2019-02-04

## 2019-01-31 RX ORDER — OXYCODONE HYDROCHLORIDE 5 MG/1
5 TABLET ORAL ONCE
Qty: 0 | Refills: 0 | Status: DISCONTINUED | OUTPATIENT
Start: 2019-01-31 | End: 2019-01-31

## 2019-01-31 RX ORDER — ONDANSETRON 8 MG/1
4 TABLET, FILM COATED ORAL ONCE
Qty: 0 | Refills: 0 | Status: DISCONTINUED | OUTPATIENT
Start: 2019-01-31 | End: 2019-02-15

## 2019-01-31 RX ADMIN — PANTOPRAZOLE SODIUM 40 MILLIGRAM(S): 20 TABLET, DELAYED RELEASE ORAL at 21:19

## 2019-01-31 RX ADMIN — FENTANYL CITRATE 25 MICROGRAM(S): 50 INJECTION INTRAVENOUS at 20:25

## 2019-01-31 NOTE — ASU DISCHARGE PLAN (ADULT/PEDIATRIC). - NOTIFY
Fever greater than 101/Swelling that continues/Inability to Tolerate Liquids or Foods/Bleeding that does not stop/Persistent Nausea and Vomiting/Pain not relieved by Medications

## 2019-01-31 NOTE — ASU DISCHARGE PLAN (ADULT/PEDIATRIC). - NURSING INSTRUCTIONS
Watch for signs of infection; redness, swelling, fever, chills or heat, report such symptoms to the MD. No driving while taking pain medication, it causes drowsiness & constipation. Keep incision clean & dry, Shower as directed by MD, do not scrub site, allow water to run over incision & pat dry. Do not apply any lotions, ointments or powders to incision. No heavy lifting. Drink 6-8 glasses of fluids daily to promote hydration. No heavy lifting, pulling or pushing heavy objects. Follow up with primary & surgical MD.

## 2019-01-31 NOTE — H&P ADULT - HISTORY OF PRESENT ILLNESS
28 year old female with diffuse cervical lymphadenopathy and systemic symptoms presenting for lymph node biopsy

## 2019-01-31 NOTE — ASU DISCHARGE PLAN (ADULT/PEDIATRIC). - CONDITIONS AT DISCHARGE
Alert & oriented. Out of chair  ambulating. Tolerating diet without nausea or vomiting. Left neck steris are dry & intact. Some swelling noted, Dr Jonathan hancock& resident came back to assess. Voiding without difficulty. Vitals stable, afebrile. Understands all discharge instruction & will follow up with the MD. Time allowed for questions.

## 2019-01-31 NOTE — ASU DISCHARGE PLAN (ADULT/PEDIATRIC). - MEDICATION SUMMARY - MEDICATIONS TO TAKE
I will START or STAY ON the medications listed below when I get home from the hospital:    predniSONE 10 mg oral tablet  -- 4 tab(s) by mouth once a day x 1 day then   3 tabs Po qd x 1 day then  2 tabs Po qd x 1 day then  1 tab Po qd x 1 day then STOP    -- It is very important that you take or use this exactly as directed.  Do not skip doses or discontinue unless directed by your doctor.  Obtain medical advice before taking any non-prescription drugs as some may affect the action of this medication.  Take with food or milk.    -- Indication: For home    oxycodone-acetaminophen 5 mg-325 mg oral tablet  -- 1 tab(s) by mouth every 6 hours MDD:4 tabs  -- Caution federal law prohibits the transfer of this drug to any person other  than the person for whom it was prescribed.  May cause drowsiness.  Alcohol may intensify this effect.  Use care when operating dangerous machinery.  This prescription cannot be refilled.  This product contains acetaminophen.  Do not use  with any other product containing acetaminophen to prevent possible liver damage.  Using more of this medication than prescribed may cause serious breathing problems.    -- Indication: For for severe pain    diphenhydrAMINE 50 mg oral capsule  -- 1 cap(s) by mouth every 4 hours, As needed, Rash and/or Itching  -- Indication: For home    ipratropium-albuterol 0.5 mg-2.5 mg/3 mLinhalation solution  --  inhaled every 6 hours, As Needed  -- Indication: For home    albuterol 90 mcg/inh inhalation aerosol  --  inhaled every 6 hours, As Needed  -- Indication: For home    cephalexin 250 mg oral capsule  -- 1 cap(s) by mouth 4 times a day   -- Finish all this medication unless otherwise directed by prescriber.    -- Indication: For antibiotics    triamcinolone 0.1% topical ointment  -- 1 application on skin 2 times a day, As needed, itching  -- Indication: For home    lactobacillus acidophilus oral capsule  -- 1 cap(s) by mouth once a day  -- Indication: For home

## 2019-01-31 NOTE — H&P ADULT - NSHPPHYSICALEXAM_GEN_ALL_CORE
NAD, awake, alert  Breathing comfortably on RA   No stridor  Voice quality normal  NC clear  OC/OP wnl  Neck soft

## 2019-02-01 DIAGNOSIS — C85.90 NON-HODGKIN LYMPHOMA, UNSPECIFIED, UNSPECIFIED SITE: ICD-10-CM

## 2019-02-01 PROCEDURE — 88189 FLOWCYTOMETRY/READ 16 & >: CPT

## 2019-02-07 ENCOUNTER — APPOINTMENT (OUTPATIENT)
Dept: OTOLARYNGOLOGY | Facility: CLINIC | Age: 29
End: 2019-02-07
Payer: COMMERCIAL

## 2019-02-07 PROCEDURE — 99024 POSTOP FOLLOW-UP VISIT: CPT

## 2019-02-07 NOTE — REVIEW OF SYSTEMS
[As Noted in HPI] : as noted in HPI [Swelling Neck] : swelling neck [Negative] : Heme/Lymph [Swelling Face] : no face swelling

## 2019-02-07 NOTE — REASON FOR VISIT
[Post-Operative Visit] : a post-operative visit [FreeTextEntry2] : suture removal [FreeTextEntry1] : s/p open lymph node biopsy

## 2019-02-07 NOTE — HISTORY OF PRESENT ILLNESS
[Neck Mass] : neck mass [de-identified] : Ms. Kaur presents for follow up s/p open biopsy of right neck mass.  She is doing well and reports that she is no longer nauseous but still notes having some shortness of breath and chest discomfort, similar symptoms preop.  I have discussed the case with Dr. Castillo and she will be following up with him next Friday.  Denies any pain or discomfort around the surgical site. [Difficulty Swallowing] : no difficulty swallowing [Painful Swallowing] : no painful swallowing

## 2019-02-07 NOTE — PHYSICAL EXAM
[Normal] : temporomandibular joint is normal [de-identified] : Wound clean and intact with moderate swelling.  Nontender, nonerythematous, no discharge.

## 2019-02-08 ENCOUNTER — APPOINTMENT (OUTPATIENT)
Dept: DERMATOLOGY | Facility: CLINIC | Age: 29
End: 2019-02-08
Payer: COMMERCIAL

## 2019-02-08 VITALS
DIASTOLIC BLOOD PRESSURE: 66 MMHG | SYSTOLIC BLOOD PRESSURE: 106 MMHG | BODY MASS INDEX: 41.09 KG/M2 | HEIGHT: 70 IN | WEIGHT: 287 LBS

## 2019-02-08 DIAGNOSIS — L85.3 XEROSIS CUTIS: ICD-10-CM

## 2019-02-08 DIAGNOSIS — L81.8 OTHER SPECIFIED DISORDERS OF PIGMENTATION: ICD-10-CM

## 2019-02-08 PROCEDURE — 99203 OFFICE O/P NEW LOW 30 MIN: CPT

## 2019-02-08 RX ORDER — HYDROXYZINE HYDROCHLORIDE 25 MG/1
25 TABLET ORAL
Qty: 30 | Refills: 1 | Status: ACTIVE | COMMUNITY
Start: 2019-02-08 | End: 1900-01-01

## 2019-02-08 RX ORDER — TRIAMCINOLONE ACETONIDE 1 MG/G
0.1 CREAM TOPICAL
Qty: 1 | Refills: 1 | Status: ACTIVE | COMMUNITY
Start: 2019-02-08 | End: 1900-01-01

## 2019-02-08 RX ORDER — CETIRIZINE HYDROCHLORIDE 10 MG/1
10 TABLET, FILM COATED ORAL
Qty: 60 | Refills: 1 | Status: ACTIVE | COMMUNITY
Start: 2019-02-08 | End: 1900-01-01

## 2019-02-08 NOTE — PHYSICAL EXAM
[Alert] : alert [Oriented x 3] : ~L oriented x 3 [Well Nourished] : well nourished [Conjunctiva Non-injected] : conjunctiva non-injected [No Visual Lymphadenopathy] : no visual  lymphadenopathy [No Clubbing] : no clubbing [No Edema] : no edema

## 2019-02-12 PROBLEM — L85.3 XEROSIS CUTIS: Status: ACTIVE | Noted: 2019-02-08

## 2019-02-12 PROBLEM — L81.8 POST INFLAMMATORY HYPOPIGMENTATION: Status: ACTIVE | Noted: 2019-02-08

## 2019-02-15 ENCOUNTER — APPOINTMENT (OUTPATIENT)
Dept: OTOLARYNGOLOGY | Facility: CLINIC | Age: 29
End: 2019-02-15
Payer: COMMERCIAL

## 2019-02-15 VITALS
HEIGHT: 70 IN | OXYGEN SATURATION: 98 % | SYSTOLIC BLOOD PRESSURE: 112 MMHG | TEMPERATURE: 99 F | BODY MASS INDEX: 41.23 KG/M2 | HEART RATE: 86 BPM | WEIGHT: 288 LBS | DIASTOLIC BLOOD PRESSURE: 70 MMHG

## 2019-02-15 DIAGNOSIS — R59.0 LOCALIZED ENLARGED LYMPH NODES: ICD-10-CM

## 2019-02-15 PROCEDURE — 99213 OFFICE O/P EST LOW 20 MIN: CPT

## 2019-03-05 ENCOUNTER — OUTPATIENT (OUTPATIENT)
Dept: OUTPATIENT SERVICES | Facility: HOSPITAL | Age: 29
LOS: 1 days | Discharge: HOME | End: 2019-03-05

## 2019-03-05 DIAGNOSIS — C85.88 OTHER SPECIFIED TYPES OF NON-HODGKIN LYMPHOMA, LYMPH NODES OF MULTIPLE SITES: ICD-10-CM

## 2019-03-05 DIAGNOSIS — Z98.890 OTHER SPECIFIED POSTPROCEDURAL STATES: Chronic | ICD-10-CM

## 2019-03-11 ENCOUNTER — APPOINTMENT (OUTPATIENT)
Dept: HEMATOLOGY ONCOLOGY | Facility: CLINIC | Age: 29
End: 2019-03-11
Payer: COMMERCIAL

## 2019-03-11 ENCOUNTER — RESULT REVIEW (OUTPATIENT)
Age: 29
End: 2019-03-11

## 2019-03-11 VITALS
WEIGHT: 286.6 LBS | HEART RATE: 69 BPM | OXYGEN SATURATION: 99 % | SYSTOLIC BLOOD PRESSURE: 125 MMHG | RESPIRATION RATE: 16 BRPM | BODY MASS INDEX: 41.5 KG/M2 | HEIGHT: 69.69 IN | DIASTOLIC BLOOD PRESSURE: 74 MMHG | TEMPERATURE: 97.9 F

## 2019-03-11 LAB
BASOPHILS # BLD AUTO: 0 K/UL — SIGNIFICANT CHANGE UP (ref 0–0.2)
BASOPHILS NFR BLD AUTO: 0.3 % — SIGNIFICANT CHANGE UP (ref 0–2)
EOSINOPHIL # BLD AUTO: 0.4 K/UL — SIGNIFICANT CHANGE UP (ref 0–0.5)
EOSINOPHIL NFR BLD AUTO: 6.7 % — HIGH (ref 0–6)
HCT VFR BLD CALC: 31.9 % — LOW (ref 34.5–45)
HGB BLD-MCNC: 10.2 G/DL — LOW (ref 11.5–15.5)
LYMPHOCYTES # BLD AUTO: 1.5 K/UL — SIGNIFICANT CHANGE UP (ref 1–3.3)
LYMPHOCYTES # BLD AUTO: 25.2 % — SIGNIFICANT CHANGE UP (ref 13–44)
MCHC RBC-ENTMCNC: 26.5 PG — LOW (ref 27–34)
MCHC RBC-ENTMCNC: 32.1 G/DL — SIGNIFICANT CHANGE UP (ref 32–36)
MCV RBC AUTO: 82.8 FL — SIGNIFICANT CHANGE UP (ref 80–100)
MONOCYTES # BLD AUTO: 0.6 K/UL — SIGNIFICANT CHANGE UP (ref 0–0.9)
MONOCYTES NFR BLD AUTO: 10 % — SIGNIFICANT CHANGE UP (ref 2–14)
NEUTROPHILS # BLD AUTO: 3.4 K/UL — SIGNIFICANT CHANGE UP (ref 1.8–7.4)
NEUTROPHILS NFR BLD AUTO: 57.7 % — SIGNIFICANT CHANGE UP (ref 43–77)
PLATELET # BLD AUTO: 315 K/UL — SIGNIFICANT CHANGE UP (ref 150–400)
RBC # BLD: 3.86 M/UL — SIGNIFICANT CHANGE UP (ref 3.8–5.2)
RBC # FLD: 13.1 % — SIGNIFICANT CHANGE UP (ref 10.3–14.5)
WBC # BLD: 5.9 K/UL — SIGNIFICANT CHANGE UP (ref 3.8–10.5)
WBC # FLD AUTO: 5.9 K/UL — SIGNIFICANT CHANGE UP (ref 3.8–10.5)

## 2019-03-11 PROCEDURE — 99205 OFFICE O/P NEW HI 60 MIN: CPT

## 2019-03-15 LAB
ALBUMIN SERPL ELPH-MCNC: 3.6 G/DL
ALP BLD-CCNC: 79 U/L
ALT SERPL-CCNC: 12 U/L
ANION GAP SERPL CALC-SCNC: 12 MMOL/L
AST SERPL-CCNC: 19 U/L
BILIRUB SERPL-MCNC: 0.2 MG/DL
BUN SERPL-MCNC: 9 MG/DL
CALCIUM SERPL-MCNC: 8.9 MG/DL
CHLORIDE SERPL-SCNC: 102 MMOL/L
CO2 SERPL-SCNC: 26 MMOL/L
CREAT SERPL-MCNC: 0.78 MG/DL
GLUCOSE SERPL-MCNC: 97 MG/DL
LDH SERPL-CCNC: 235 U/L
POTASSIUM SERPL-SCNC: 4 MMOL/L
PROT SERPL-MCNC: 7.1 G/DL
SODIUM SERPL-SCNC: 139 MMOL/L
URATE SERPL-MCNC: 4 MG/DL

## 2019-03-17 PROBLEM — R59.0 CERVICAL LYMPHADENOPATHY: Status: ACTIVE | Noted: 2017-06-12

## 2019-03-17 NOTE — HISTORY OF PRESENT ILLNESS
[de-identified] : Patient with diffuse LAD, diagnosis of lupus and systemic symptoms consistent with "B" symptoms of lymphoma.  She is now s/p excisional biopsy of a right neck node.  Intraoperatively, the previously noted LAD in the right neck on CT was not that significant but the patient had significant inflammatory changes noted.  She continues to report intermittent "B" symptoms but is recovering well from the procedure.

## 2019-03-17 NOTE — PHYSICAL EXAM
[de-identified] : Incision healing well, there is again LAD noted in the right neck. [Midline] : trachea located in midline position [Normal] : no rashes

## 2019-03-17 NOTE — DATA REVIEWED
[de-identified] : Pathology with follicular hyperplasia and focal interfollicular hyperplasia with increased immunoblast-like cells.

## 2019-03-29 ENCOUNTER — LABORATORY RESULT (OUTPATIENT)
Age: 29
End: 2019-03-29

## 2019-03-29 ENCOUNTER — APPOINTMENT (OUTPATIENT)
Dept: DERMATOLOGY | Facility: CLINIC | Age: 29
End: 2019-03-29
Payer: COMMERCIAL

## 2019-03-29 DIAGNOSIS — D48.5 NEOPLASM OF UNCERTAIN BEHAVIOR OF SKIN: ICD-10-CM

## 2019-03-29 LAB
C3 SERPL-MCNC: 120 MG/DL
C4 SERPL-MCNC: 35 MG/DL
THYROGLOB AB SERPL-ACNC: <20 IU/ML
THYROPEROXIDASE AB SERPL IA-ACNC: <10 IU/ML
TSH SERPL-ACNC: 2.28 UIU/ML

## 2019-03-29 PROCEDURE — 99214 OFFICE O/P EST MOD 30 MIN: CPT | Mod: 25

## 2019-03-29 PROCEDURE — 11104 PUNCH BX SKIN SINGLE LESION: CPT

## 2019-04-12 ENCOUNTER — APPOINTMENT (OUTPATIENT)
Dept: DERMATOLOGY | Facility: CLINIC | Age: 29
End: 2019-04-12
Payer: COMMERCIAL

## 2019-04-12 DIAGNOSIS — L30.9 DERMATITIS, UNSPECIFIED: ICD-10-CM

## 2019-04-12 PROCEDURE — 99213 OFFICE O/P EST LOW 20 MIN: CPT

## 2019-05-06 LAB
ANA PAT FLD IF-IMP: ABNORMAL
ANA PATTERN: ABNORMAL
ANA SER IF-ACNC: ABNORMAL
ANA TITER: ABNORMAL

## 2019-05-10 ENCOUNTER — APPOINTMENT (OUTPATIENT)
Dept: OTOLARYNGOLOGY | Facility: CLINIC | Age: 29
End: 2019-05-10

## 2019-07-02 NOTE — PHYSICAL EXAM
[Normal] : affect appropriate [de-identified] : palpable lymphadenopathy on the right, around 1-2 cm.  Incision healed

## 2019-07-02 NOTE — HISTORY OF PRESENT ILLNESS
[de-identified] : This is a 28 year old female who presents today for evaluation of an abnormal lymph node biopsy.  Patient was diagnosed with Lupus in May 2018 by serum and skin, and sees rheumatology. She is being treated with Plaquenil and Ibuprofen.  She states her history of lymphadenopathy began in 2017, treated with multiple courses of antibiotics.  After one course of clindamycin for sialoadenitis, she was admitted in July 2017 for a drug rash/facial swelling.  She had a CT of the neck 7/16 completed which revealed enlarged submandibular gland on the right with surrounding edema, infiltration into the skin and soft tissue.  Enlarged bilateral level 1A nodes 1.7 x 1.3 cm, 1 B 2.1 x 1.7 cm, level 2 2.6 x 1.9, level 4 2.0 x 1.6 cm.  She was treated with a course of steroids, had a repeat CT of the neck on 7/26 which was essentially unchanged, ?slightly more enlarged lymph nodes and she underwent a cervical lymph node core biopsy of a submental node-  revealed  Interfollicular hyperplasia with focal necrosis.  She went to the ER in  in January due to 3 week history of neck swelling.  She had a CT scan on 1/15/19- which revealed nonspecific stranding of the fat in the submental/submandibular regions, ?cellulitis, multiple enlarged cervical lymph nodes ranging 1.4 x 3.1 cm in size as well as nonspecific right paratracheal lymphadenopathy in the superior mediastinum.  She was seen by Dr. Jef Castillo as an outpatient and underwent an excisional right level 2 cervical lymph node excision.  Pathology revealed follicular hyperplasia and focal interfollicular hyperplasia with increased immunoblast-like cells, normal karyotype.  Flow was nondiagnostic. \par \par She currently has multiple complaints.  She feels persistently fatigued, in addition to c/o edema/arthralgias of the knees, hands, and fingers.  She does not 'feel herself.'  She has a Baker's cyst in calf for which she is seeing an orthopedist.  Patient also c/o ongoing night sweats, waxing and waning since 2017. She notes that occasional significant night sweats completely disrupt her sleep. She also reports recent weight loss, about 10 pounds.  She feels depressed but denies any suicidal or homicidal ideations.  She has no headaches, no visual changes, no chest pain, no SOB, no abdominal pain, no n/v/d.  \par  \par Patient's paternal aunt passed away due to breast cancer (48 y/o). Patient has several siblings, all without PMHx. \par Patient moved to [place] from [place] at age 12.\par Patient works two jobs, 13-15 hours a day. \par \par Labs:\par Core biopsy of right lymph node, submantle (7/27/18) revealed interfollicular hyperplasia with focal necrosis. \par Excision of right lymph node, level 2 (2/07/19) revealed follicular hyperplasia and focal interfollicular hyperplasia with increased immunoblast-like cells.

## 2019-07-02 NOTE — ADDENDUM
[FreeTextEntry1] : Documented by Verenice Luz acting as a scribe for Dr. Kat Shin on 3/11/2019.\par \par All medical record entries made by the Scribe were at my, Dr. Kat Shin's, direction and personally dictated by me on 3/11/2019. I have reviewed the chart and agree that  the record accurately reflects my personal performance of the history, physical exam, assessment and plan. I have also personally directed, reviewed, and agree with the discharge instructions.\par

## 2019-07-02 NOTE — REVIEW OF SYSTEMS
[Night Sweats] : night sweats [Fatigue] : fatigue [Recent Change In Weight] : ~T recent weight change [Joint Pain] : joint pain [Joint Stiffness] : joint stiffness [Swollen Glands] : swollen glands [Negative] : Allergic/Immunologic [Fever] : no fever [Chills] : no chills [FreeTextEntry9] : swelling

## 2019-07-02 NOTE — ASSESSMENT
[FreeTextEntry1] : This is a 28 year old female who presents today for evaluation of her persistent cervical lymphadenopathy which has been present since 2017, has been slowly worsening.  She has a history of SLE, diagnosed in 5/2018.  \par She underwent an excisional lymph node biopsy on 1/31 which revealed follicular hyperplasia.  Core biopsy completed in 2017 was also consistent with follicular hyperplasia.  Suspect the lymphadenopathy is reactive due to her underlying autoimmune disease but would image further to rule out an underlying lymphoproliferative disorder.\par Will image with a PET/CT scan. \par Check her CMP, [facility] today.  \par She will follow up after the scan is done to determine the next steps. \par She will continue to follow up with her rheumatologist regarding her SLE and her PCP, Dr. Thi Spencer. \par All questions/concerns were answered.

## 2020-01-01 ENCOUNTER — EMERGENCY (EMERGENCY)
Facility: HOSPITAL | Age: 30
LOS: 0 days | Discharge: HOME | End: 2020-01-01
Attending: EMERGENCY MEDICINE
Payer: COMMERCIAL

## 2020-01-01 VITALS
OXYGEN SATURATION: 98 % | DIASTOLIC BLOOD PRESSURE: 64 MMHG | WEIGHT: 291.01 LBS | SYSTOLIC BLOOD PRESSURE: 146 MMHG | HEART RATE: 112 BPM | TEMPERATURE: 103 F | RESPIRATION RATE: 21 BRPM | HEIGHT: 70 IN

## 2020-01-01 VITALS
SYSTOLIC BLOOD PRESSURE: 136 MMHG | HEART RATE: 117 BPM | RESPIRATION RATE: 20 BRPM | TEMPERATURE: 100 F | OXYGEN SATURATION: 96 % | DIASTOLIC BLOOD PRESSURE: 69 MMHG

## 2020-01-01 DIAGNOSIS — R53.1 WEAKNESS: ICD-10-CM

## 2020-01-01 DIAGNOSIS — J03.90 ACUTE TONSILLITIS, UNSPECIFIED: ICD-10-CM

## 2020-01-01 DIAGNOSIS — Z91.040 LATEX ALLERGY STATUS: ICD-10-CM

## 2020-01-01 DIAGNOSIS — J02.9 ACUTE PHARYNGITIS, UNSPECIFIED: ICD-10-CM

## 2020-01-01 DIAGNOSIS — Z91.013 ALLERGY TO SEAFOOD: ICD-10-CM

## 2020-01-01 DIAGNOSIS — J45.909 UNSPECIFIED ASTHMA, UNCOMPLICATED: ICD-10-CM

## 2020-01-01 DIAGNOSIS — Z98.890 OTHER SPECIFIED POSTPROCEDURAL STATES: Chronic | ICD-10-CM

## 2020-01-01 DIAGNOSIS — Z88.8 ALLERGY STATUS TO OTHER DRUGS, MEDICAMENTS AND BIOLOGICAL SUBSTANCES STATUS: ICD-10-CM

## 2020-01-01 DIAGNOSIS — R59.9 ENLARGED LYMPH NODES, UNSPECIFIED: ICD-10-CM

## 2020-01-01 DIAGNOSIS — R76.0 RAISED ANTIBODY TITER: ICD-10-CM

## 2020-01-01 DIAGNOSIS — R50.9 FEVER, UNSPECIFIED: ICD-10-CM

## 2020-01-01 LAB
ALBUMIN SERPL ELPH-MCNC: 3.7 G/DL — SIGNIFICANT CHANGE UP (ref 3.3–5)
ALP SERPL-CCNC: 101 U/L — SIGNIFICANT CHANGE UP (ref 40–120)
ALT FLD-CCNC: 33 U/L — SIGNIFICANT CHANGE UP (ref 12–78)
ANION GAP SERPL CALC-SCNC: 8 MMOL/L — SIGNIFICANT CHANGE UP (ref 5–17)
AST SERPL-CCNC: 31 U/L — SIGNIFICANT CHANGE UP (ref 15–37)
BASOPHILS # BLD AUTO: 0.03 K/UL — SIGNIFICANT CHANGE UP (ref 0–0.2)
BASOPHILS NFR BLD AUTO: 0.3 % — SIGNIFICANT CHANGE UP (ref 0–2)
BILIRUB SERPL-MCNC: 0.5 MG/DL — SIGNIFICANT CHANGE UP (ref 0.2–1.2)
BUN SERPL-MCNC: 6 MG/DL — LOW (ref 7–23)
CALCIUM SERPL-MCNC: 9.2 MG/DL — SIGNIFICANT CHANGE UP (ref 8.5–10.1)
CHLORIDE SERPL-SCNC: 103 MMOL/L — SIGNIFICANT CHANGE UP (ref 96–108)
CO2 SERPL-SCNC: 27 MMOL/L — SIGNIFICANT CHANGE UP (ref 22–31)
CREAT SERPL-MCNC: 1.05 MG/DL — SIGNIFICANT CHANGE UP (ref 0.5–1.3)
EOSINOPHIL # BLD AUTO: 0.15 K/UL — SIGNIFICANT CHANGE UP (ref 0–0.5)
EOSINOPHIL NFR BLD AUTO: 1.3 % — SIGNIFICANT CHANGE UP (ref 0–6)
FLU A RESULT: SIGNIFICANT CHANGE UP
FLU A RESULT: SIGNIFICANT CHANGE UP
FLUAV AG NPH QL: SIGNIFICANT CHANGE UP
FLUBV AG NPH QL: SIGNIFICANT CHANGE UP
GLUCOSE SERPL-MCNC: 110 MG/DL — HIGH (ref 70–99)
HCG SERPL-ACNC: <1 MIU/ML — SIGNIFICANT CHANGE UP
HCT VFR BLD CALC: 44.1 % — SIGNIFICANT CHANGE UP (ref 34.5–45)
HGB BLD-MCNC: 13.7 G/DL — SIGNIFICANT CHANGE UP (ref 11.5–15.5)
IMM GRANULOCYTES NFR BLD AUTO: 0.4 % — SIGNIFICANT CHANGE UP (ref 0–1.5)
LYMPHOCYTES # BLD AUTO: 0.99 K/UL — LOW (ref 1–3.3)
LYMPHOCYTES # BLD AUTO: 8.8 % — LOW (ref 13–44)
MCHC RBC-ENTMCNC: 27.4 PG — SIGNIFICANT CHANGE UP (ref 27–34)
MCHC RBC-ENTMCNC: 31.1 GM/DL — LOW (ref 32–36)
MCV RBC AUTO: 88.2 FL — SIGNIFICANT CHANGE UP (ref 80–100)
MONOCYTES # BLD AUTO: 0.7 K/UL — SIGNIFICANT CHANGE UP (ref 0–0.9)
MONOCYTES NFR BLD AUTO: 6.2 % — SIGNIFICANT CHANGE UP (ref 2–14)
NEUTROPHILS # BLD AUTO: 9.37 K/UL — HIGH (ref 1.8–7.4)
NEUTROPHILS NFR BLD AUTO: 83 % — HIGH (ref 43–77)
NRBC # BLD: 0 /100 WBCS — SIGNIFICANT CHANGE UP (ref 0–0)
PLATELET # BLD AUTO: 233 K/UL — SIGNIFICANT CHANGE UP (ref 150–400)
POTASSIUM SERPL-MCNC: 4.6 MMOL/L — SIGNIFICANT CHANGE UP (ref 3.5–5.3)
POTASSIUM SERPL-SCNC: 4.6 MMOL/L — SIGNIFICANT CHANGE UP (ref 3.5–5.3)
PROT SERPL-MCNC: 8.4 GM/DL — HIGH (ref 6–8.3)
RBC # BLD: 5 M/UL — SIGNIFICANT CHANGE UP (ref 3.8–5.2)
RBC # FLD: 14 % — SIGNIFICANT CHANGE UP (ref 10.3–14.5)
RSV RESULT: SIGNIFICANT CHANGE UP
RSV RNA RESP QL NAA+PROBE: SIGNIFICANT CHANGE UP
SODIUM SERPL-SCNC: 138 MMOL/L — SIGNIFICANT CHANGE UP (ref 135–145)
WBC # BLD: 11.29 K/UL — HIGH (ref 3.8–10.5)
WBC # FLD AUTO: 11.29 K/UL — HIGH (ref 3.8–10.5)

## 2020-01-01 PROCEDURE — 70491 CT SOFT TISSUE NECK W/DYE: CPT | Mod: 26

## 2020-01-01 PROCEDURE — 99284 EMERGENCY DEPT VISIT MOD MDM: CPT

## 2020-01-01 RX ORDER — ACETAMINOPHEN 500 MG
1000 TABLET ORAL ONCE
Refills: 0 | Status: COMPLETED | OUTPATIENT
Start: 2020-01-01 | End: 2020-01-01

## 2020-01-01 RX ORDER — IPRATROPIUM/ALBUTEROL SULFATE 18-103MCG
0 AEROSOL WITH ADAPTER (GRAM) INHALATION
Qty: 0 | Refills: 0 | DISCHARGE

## 2020-01-01 RX ORDER — ALBUTEROL 90 UG/1
0 AEROSOL, METERED ORAL
Qty: 0 | Refills: 0 | DISCHARGE

## 2020-01-01 RX ORDER — IPRATROPIUM/ALBUTEROL SULFATE 18-103MCG
3 AEROSOL WITH ADAPTER (GRAM) INHALATION ONCE
Refills: 0 | Status: COMPLETED | OUTPATIENT
Start: 2020-01-01 | End: 2020-01-01

## 2020-01-01 RX ORDER — SODIUM CHLORIDE 9 MG/ML
1000 INJECTION, SOLUTION INTRAVENOUS ONCE
Refills: 0 | Status: COMPLETED | OUTPATIENT
Start: 2020-01-01 | End: 2020-01-01

## 2020-01-01 RX ORDER — AMPICILLIN SODIUM AND SULBACTAM SODIUM 250; 125 MG/ML; MG/ML
3 INJECTION, POWDER, FOR SUSPENSION INTRAMUSCULAR; INTRAVENOUS ONCE
Refills: 0 | Status: COMPLETED | OUTPATIENT
Start: 2020-01-01 | End: 2020-01-01

## 2020-01-01 RX ADMIN — Medication 1000 MILLIGRAM(S): at 15:21

## 2020-01-01 RX ADMIN — Medication 125 MILLIGRAM(S): at 15:22

## 2020-01-01 RX ADMIN — SODIUM CHLORIDE 1000 MILLILITER(S): 9 INJECTION, SOLUTION INTRAVENOUS at 12:38

## 2020-01-01 RX ADMIN — SODIUM CHLORIDE 1000 MILLILITER(S): 9 INJECTION, SOLUTION INTRAVENOUS at 13:28

## 2020-01-01 RX ADMIN — AMPICILLIN SODIUM AND SULBACTAM SODIUM 200 GRAM(S): 250; 125 INJECTION, POWDER, FOR SUSPENSION INTRAMUSCULAR; INTRAVENOUS at 15:21

## 2020-01-01 RX ADMIN — Medication 1000 MILLIGRAM(S): at 15:27

## 2020-01-01 RX ADMIN — Medication 400 MILLIGRAM(S): at 12:38

## 2020-01-01 RX ADMIN — Medication 3 MILLILITER(S): at 12:49

## 2020-01-01 NOTE — ED PROVIDER NOTE - PATIENT PORTAL LINK FT
You can access the FollowMyHealth Patient Portal offered by [facility] by registering at the following website: [email or web address]. By joining [employer]’s FollowMyHealth portal, you will also be able to view your health information using other applications (apps) compatible with our system.

## 2020-01-01 NOTE — ED ADULT NURSE NOTE - CHIEF COMPLAINT QUOTE
pt c/o sore throat , body aches , chills , fever , for 4 days , pt was tested for strep that came back negative , feeling worse today

## 2020-01-01 NOTE — ED ADULT NURSE NOTE - NSIMPLEMENTINTERV_GEN_ALL_ED
Implemented All Fall Risk Interventions:  [place] to call system. Call bell, personal items and telephone within reach. Instruct patient to call for assistance. Room bathroom lighting operational. Non-slip footwear when patient is off stretcher. Physically safe environment: no spills, clutter or unnecessary equipment. Stretcher in lowest position, wheels locked, appropriate side rails in place. Provide visual cue, wrist band, yellow gown, etc. Monitor gait and stability. Monitor for mental status changes and reorient to person, place, and time. Review medications for side effects contributing to fall risk. Reinforce activity limits and safety measures with patient and family.

## 2020-01-01 NOTE — ED PROVIDER NOTE - NSFOLLOWUPCLINICS_GEN_ALL_ED_FT
[facility] - ENT  Otolaryngology (ENT)  [address] [place] [address]  Phone: [phone]  Fax:   Follow Up Time:

## 2020-01-01 NOTE — ED ADULT TRIAGE NOTE - CHIEF COMPLAINT QUOTE
pt c/o sore throat , body aches , chills , fever , for 4 days , pt was tested for strep that came back negative , feeling worse today no difficulties

## 2020-01-01 NOTE — ED ADULT NURSE NOTE - OBJECTIVE STATEMENT
pt c/o sore throat , body aches , chills , fever , for 4 days , pt was tested for strep that came back negative , feeling worse today. pt c/o sore throat , body aches , chills , fever , for 4 days , pt was tested for strep that came back negative , feeling worse today. PMH Lupus, asthma.

## 2020-01-01 NOTE — ED PROVIDER NOTE - CLINICAL SUMMARY MEDICAL DECISION MAKING FREE TEXT BOX
labs and diagnostic imaging results reviewed with patient; abx, corticosteroids, analgesics; PMD or ENT clinic follow up recommended for reassessment. Patient is aware of signs/symptoms to return to the emergency department.

## 2020-04-09 ENCOUNTER — EMERGENCY (EMERGENCY)
Facility: HOSPITAL | Age: 30
LOS: 0 days | Discharge: HOME | End: 2020-04-09
Attending: EMERGENCY MEDICINE
Payer: COMMERCIAL

## 2020-04-09 VITALS
HEIGHT: 70 IN | WEIGHT: 289.03 LBS | DIASTOLIC BLOOD PRESSURE: 77 MMHG | TEMPERATURE: 99 F | RESPIRATION RATE: 20 BRPM | SYSTOLIC BLOOD PRESSURE: 111 MMHG | HEART RATE: 76 BPM | OXYGEN SATURATION: 99 %

## 2020-04-09 VITALS — OXYGEN SATURATION: 99 % | RESPIRATION RATE: 20 BRPM | HEART RATE: 66 BPM

## 2020-04-09 DIAGNOSIS — Z91.040 LATEX ALLERGY STATUS: ICD-10-CM

## 2020-04-09 DIAGNOSIS — M32.9 SYSTEMIC LUPUS ERYTHEMATOSUS, UNSPECIFIED: ICD-10-CM

## 2020-04-09 DIAGNOSIS — U07.1 COVID-19: ICD-10-CM

## 2020-04-09 DIAGNOSIS — Z91.041 RADIOGRAPHIC DYE ALLERGY STATUS: ICD-10-CM

## 2020-04-09 DIAGNOSIS — J11.1 INFLUENZA DUE TO UNIDENTIFIED INFLUENZA VIRUS WITH OTHER RESPIRATORY MANIFESTATIONS: ICD-10-CM

## 2020-04-09 DIAGNOSIS — Z98.890 OTHER SPECIFIED POSTPROCEDURAL STATES: Chronic | ICD-10-CM

## 2020-04-09 DIAGNOSIS — R59.9 ENLARGED LYMPH NODES, UNSPECIFIED: ICD-10-CM

## 2020-04-09 LAB
ALBUMIN SERPL ELPH-MCNC: 3.5 G/DL — SIGNIFICANT CHANGE UP (ref 3.3–5)
ALP SERPL-CCNC: 113 U/L — SIGNIFICANT CHANGE UP (ref 40–120)
ALT FLD-CCNC: 91 U/L — HIGH (ref 12–78)
ANION GAP SERPL CALC-SCNC: 6 MMOL/L — SIGNIFICANT CHANGE UP (ref 5–17)
AST SERPL-CCNC: 61 U/L — HIGH (ref 15–37)
BASOPHILS # BLD AUTO: 0 K/UL — SIGNIFICANT CHANGE UP (ref 0–0.2)
BASOPHILS NFR BLD AUTO: 0 % — SIGNIFICANT CHANGE UP (ref 0–2)
BILIRUB SERPL-MCNC: 0.3 MG/DL — SIGNIFICANT CHANGE UP (ref 0.2–1.2)
BUN SERPL-MCNC: 7 MG/DL — SIGNIFICANT CHANGE UP (ref 7–23)
CALCIUM SERPL-MCNC: 8.7 MG/DL — SIGNIFICANT CHANGE UP (ref 8.5–10.1)
CHLORIDE SERPL-SCNC: 108 MMOL/L — SIGNIFICANT CHANGE UP (ref 96–108)
CO2 SERPL-SCNC: 24 MMOL/L — SIGNIFICANT CHANGE UP (ref 22–31)
CREAT SERPL-MCNC: 0.82 MG/DL — SIGNIFICANT CHANGE UP (ref 0.5–1.3)
EOSINOPHIL # BLD AUTO: 0.04 K/UL — SIGNIFICANT CHANGE UP (ref 0–0.5)
EOSINOPHIL NFR BLD AUTO: 1 % — SIGNIFICANT CHANGE UP (ref 0–6)
GLUCOSE SERPL-MCNC: 87 MG/DL — SIGNIFICANT CHANGE UP (ref 70–99)
HCT VFR BLD CALC: 39.5 % — SIGNIFICANT CHANGE UP (ref 34.5–45)
HGB BLD-MCNC: 12.5 G/DL — SIGNIFICANT CHANGE UP (ref 11.5–15.5)
LYMPHOCYTES # BLD AUTO: 1.75 K/UL — SIGNIFICANT CHANGE UP (ref 1–3.3)
LYMPHOCYTES # BLD AUTO: 39 % — SIGNIFICANT CHANGE UP (ref 13–44)
MANUAL SMEAR VERIFICATION: YES — SIGNIFICANT CHANGE UP
MCHC RBC-ENTMCNC: 27.5 PG — SIGNIFICANT CHANGE UP (ref 27–34)
MCHC RBC-ENTMCNC: 31.6 GM/DL — LOW (ref 32–36)
MCV RBC AUTO: 86.8 FL — SIGNIFICANT CHANGE UP (ref 80–100)
MONOCYTES # BLD AUTO: 0.27 K/UL — SIGNIFICANT CHANGE UP (ref 0–0.9)
MONOCYTES NFR BLD AUTO: 6 % — SIGNIFICANT CHANGE UP (ref 2–14)
NEUTROPHILS # BLD AUTO: 2.06 K/UL — SIGNIFICANT CHANGE UP (ref 1.8–7.4)
NEUTROPHILS NFR BLD AUTO: 46 % — SIGNIFICANT CHANGE UP (ref 43–77)
NRBC # BLD: 0 /100 — SIGNIFICANT CHANGE UP (ref 0–0)
NRBC # BLD: SIGNIFICANT CHANGE UP /100 WBCS (ref 0–0)
PLAT MORPH BLD: NORMAL — SIGNIFICANT CHANGE UP
PLATELET # BLD AUTO: 205 K/UL — SIGNIFICANT CHANGE UP (ref 150–400)
POTASSIUM SERPL-MCNC: 3.9 MMOL/L — SIGNIFICANT CHANGE UP (ref 3.5–5.3)
POTASSIUM SERPL-SCNC: 3.9 MMOL/L — SIGNIFICANT CHANGE UP (ref 3.5–5.3)
PROT SERPL-MCNC: 7.6 GM/DL — SIGNIFICANT CHANGE UP (ref 6–8.3)
RBC # BLD: 4.55 M/UL — SIGNIFICANT CHANGE UP (ref 3.8–5.2)
RBC # FLD: 13.2 % — SIGNIFICANT CHANGE UP (ref 10.3–14.5)
RBC BLD AUTO: NORMAL — SIGNIFICANT CHANGE UP
SODIUM SERPL-SCNC: 138 MMOL/L — SIGNIFICANT CHANGE UP (ref 135–145)
TROPONIN I SERPL-MCNC: <.015 NG/ML — SIGNIFICANT CHANGE UP (ref 0.01–0.04)
VARIANT LYMPHS # BLD: 8 % — HIGH (ref 0–6)
WBC # BLD: 4.48 K/UL — SIGNIFICANT CHANGE UP (ref 3.8–10.5)
WBC # FLD AUTO: 4.48 K/UL — SIGNIFICANT CHANGE UP (ref 3.8–10.5)

## 2020-04-09 PROCEDURE — 99284 EMERGENCY DEPT VISIT MOD MDM: CPT

## 2020-04-09 PROCEDURE — 71045 X-RAY EXAM CHEST 1 VIEW: CPT | Mod: 26

## 2020-04-09 NOTE — ED PROVIDER NOTE - OBJECTIVE STATEMENT
30yo female with pmh of lymphoma (in remission x 3 years), lupus and asthma presents complaining of sob x 2 days. States she was feeling sob and went to get tested for covid and came back + for covid yesterday. This morning she was taking a shower and "felt like I couldn't breathe" prompting ED visit. Endorses dry cough and chest tightness at the center of her chest that is only slightly relieved by MDI. Last MDI this morning. Also reporting eye pain b/l. Denies visual changes, trauma, photosensitivity, headache, abdominal pain, n/v/d, palpitations, loss of consciousness and other associated sx.

## 2020-04-09 NOTE — ED PROVIDER NOTE - CLINICAL SUMMARY MEDICAL DECISION MAKING FREE TEXT BOX
30yo hx of asthma, lupus, and lymphoma (remission x 3 years) + covid presenting with worsening sob. RRR. O2 sat 95 on RA. Breathing slightly diminished throughout. Will give MDI. Will get basic labs and cxr. 30yo hx of asthma, lupus, and lymphoma (remission x 3 years) + covid presenting with worsening sob. RRR. O2 sat 95 on RA. Will get basic labs and cxr.

## 2020-04-09 NOTE — ED PROVIDER NOTE - PROGRESS NOTE DETAILS
Pt sat  on RA. Given MDI, breath sounds improvd. Neg trop. Stable for dc with strict return precautions.

## 2020-04-09 NOTE — ED PROVIDER NOTE - ATTENDING CONTRIBUTION TO CARE
Patient evaluated and seen with TYRA Pablo agree with above history and physical - pt examined and seen by me personally - findings as seen: Pt otherwise with symptoms continued with Covid 19 infection, labwork performed as pt with hx of Lupus, no hypoxia, no tachycardia noted and pt not tachypneic will dc with PMD follow up and tylenol as needed for pain.

## 2020-04-09 NOTE — ED ADULT TRIAGE NOTE - CHIEF COMPLAINT QUOTE
patient BIBA c/o of difficulty breathing , c/o of cough , c/o of fever , c/o of chest pain when breathing and coughing , patient positive for Covid

## 2020-04-09 NOTE — ED PROVIDER NOTE - PATIENT PORTAL LINK FT
You can access the FollowMyHealth Patient Portal offered by [facility] by registering at the following website: [email or web address]. By joining [employer]’s FollowMyHealth portal, you will also be able to view your health information using other applications (apps) compatible with our system.

## 2020-04-09 NOTE — ED PROVIDER NOTE - NSFOLLOWUPINSTRUCTIONS_ED_ALL_ED_FT
Take 1 tab of doxycycline twice a day for one week.   Continue to take your MDI pump for asthma as needed.   Continue to monitor your Pulse Ox at home. If you become hypoxic (<94% on room air) come back to the ER.   -- Please avoid contact with others while you are symptomatic and ideally two weeks.   -- Rest, increase your fluid intake and avoid dehydration & alcohol.   -- It is very important to be diligent about hand washing & hygiene to avoid spreading the illness to others.  -- If you are having any worsening or continued fever, chills, weakness, nausea, vomiting, worsening shortness of breath, worsening chest pain please see your doctor or return to the Emergency Department.  -- Please continue taking your home medications as directed.     What should I do now?    If you are well enough to be discharged home, you should care for yourself at home exactly like you would if you have Influenza “flu”. Follow all the standard guidelines about washing your hands, covering your cough, etc. You should return to the Emergency Department if you develop worse symptoms, trouble breathing, chest pain, and/or a fever that doesn’t improve with over the counter acetaminophen.

## 2020-04-11 RX ORDER — ACETAMINOPHEN 500 MG
2 TABLET ORAL
Qty: 40 | Refills: 0
Start: 2020-04-11 | End: 2020-04-15

## 2021-06-04 NOTE — PATIENT PROFILE ADULT. - TOBACCO USE
The patient is Stable - Low risk of patient condition declining or worsening    Shift Goals  Clinical Goals: Light bleeding    Progress made toward(s) clinical / shift goals:  Fundus firm, lochia light           Never smoker

## 2021-11-16 ENCOUNTER — TRANSCRIPTION ENCOUNTER (OUTPATIENT)
Age: 31
End: 2021-11-16

## 2022-02-20 NOTE — ASU PATIENT PROFILE, ADULT - BLOOD TRANSFUSION, PREVIOUS, PROFILE
As per pt she has right shoulder arthritis. (+) Neuropathic pain   Pain control with Tylenol PRN and gabapentin 300mg TID no

## 2022-10-28 NOTE — DIETITIAN INITIAL EVALUATION ADULT. - NUTRITION INTERVENTION
Nutrition Education/Strategies/Meals and Snack/Collaboration and Referral of Nutrition Care [facility] Counseling: I discussed with the patient the risks of valacyclovir including but not limited to kidney damage, nausea, vomiting and severe allergy.  The patient understands that if the infection seems to be worsening or is not improving, they are to call.

## 2022-12-01 NOTE — ASU DISCHARGE PLAN (ADULT/PEDIATRIC). - SPECIAL INSTRUCTIONS
-take tylenol for mild/moderate pain     -take percocet as needed for severe pain     -regular diet, no restrictions     -no heavy lifting or straining     -leave white strips in place, may fall off on their own     -ok to shower in 48 hours, do not scrub area of incision 77

## 2023-05-24 NOTE — HISTORY OF PRESENT ILLNESS
[de-identified] : Pt here for hx of LAD.  Last CT was 1/15/19.  Pt is here to discuss the results. Pt c/o pain, swelling, sore throat, occasional dysphonia and dysphagia.  Last week, pt went to [place] for dysphagia and facial swelling. Pt was hydrated and got a CT scan and was discharged.  Pt was diagnosed with Lupus in May 2018.  She reports that she has had fevers, chills, night sweats for over a month.  She also recently underwent US of her legs and reports that LAD was noted bilaterally. [Follow-Up] : a follow-up evaluation of [FreeTextEntry2] : sono f/u

## 2023-11-18 NOTE — ED PROVIDER NOTE - NS ED MD DISPO DIVISION
Problem: Adult Inpatient Plan of Care  Goal: Plan of Care Review  Outcome: Ongoing, Progressing  Goal: Patient-Specific Goal (Individualized)  Outcome: Ongoing, Progressing  Goal: Absence of Hospital-Acquired Illness or Injury  Outcome: Ongoing, Progressing  Goal: Optimal Comfort and Wellbeing  Outcome: Ongoing, Progressing  Goal: Readiness for Transition of Care  Outcome: Ongoing, Progressing     Problem: Infection  Goal: Absence of Infection Signs and Symptoms  Outcome: Ongoing, Progressing     Problem: Fall Injury Risk  Goal: Absence of Fall and Fall-Related Injury  Outcome: Ongoing, Progressing      RAJ

## 2024-03-20 NOTE — ASU PREOP CHECKLIST - SKIN PREP
Anxiety is doing much better and patient does take her medicines as needed.  Present management and counseling advised.   n/a

## 2024-05-02 NOTE — ASU PATIENT PROFILE, ADULT - FALL HARM RISK
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  Onset  - Date of onset: 3/23/2024  Chart reviewed at time of initial evaluation (relevant co-morbidities, allergies, tests and medications listed):   - Diagnostic tests reviewed: CT Scan  diabetes and hypertension  X-ray occurred 4/30/24 with results pending  MRI ordered with scheduling pending    Allergies, medical history, medications reviewed with patient as listed in medical chart    Past Medical History:  07/2019: Cerebral infarction (CMD)      Comment:  right side weakness  No date: Diabetes mellitus (CMD)      Comment:  type 2  No date: Essential (primary) hypertension  No date: Hyperlipidemia  9/23/2008: Lumbago        SUBJECTIVE                                                                                                               Patient reports there was ice on ground as he was walking to move a blocker car (car that blocks entrance to lot so nobody can steal a car off the parking lot) so he walked a different way and he thinks there must have been more ice he did not see.  He slipped and fell and reports loss consciousness.  When he regained consciousness he was alone.  He noticed items from his pockets were scattered on the ground at that time.  He got up and walked into the work building and told his boss what happened and his boss took him to Urgent Care.  He was told he had a concussion and was transported to the hospital.  CT scan performed at that point.  Since that time he has seen  a Neurologist and ortho/spine.  He is currently not working.  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  Sitting and standing during the day, 50% each per patient report.  Was told he could take the collar off by ortho and be in the collar 4 hours/day.  He is currently wearing it 6 hours/day    Headaches: right side of head only, no h/o headaches prior to that    Neck pain: right side only    Dizziness: denies    Pain / Symptoms  - Pain/symptom is: constant  - Pain rating (out of 10): Current: 6 ; Best: 4; Worst: 10  - Location: Right lateral cervical spine  - Quality / Description: ache  - Alleviating Factors: rest     - Self pressure to right lateral cervical spine   Voltaren ordered (1 pill 2x/day) but he has not picked up prescription yet  - Progression since onset: improved    Function:   Limitations / Exacerbation Factors:   - Patient reports pain and difficulty with function reported below.  - sleep disturbed, upper body dressing, grooming/hygiene/self-care activities, driving/riding in a vehicle, house/yard work, grocery shopping, lifting/carrying and pushing/pulling  Prior Level of Function: pain free ADLs and IADLs. declining function, therefore referred to therapy,    Patient Goals: . To get better    Prior treatment  - no therapies  - Discharged from hospital, home health, or skilled nursing facility in last 30 days: no  Home Environment   - Patient lives with: significant other  - Type of home: apartment (2nd floor with no elevator)  - Assistance available: consistent  - Denies 2 or more falls or an unexplained fall with injury in the last year.  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  - : Tara Lewis  - Restrictions: currently not working  - Current Work Status: off work due to current condition  - Full Duty Work Demands: light  (10 - 20 lbs)  - standing >50% of the day and sitting >50% of the day       OBJECTIVE                                                                                                                    Observation   Patient wearing hard cervical collar - has been wearing 24 hours/day 7 days a week since fall 3/23/34 per initial report, then later states he does not wear it when sitting to watch TV    Without collar forward head position noted    Range of Motion (ROM)   (degrees unless noted; active unless noted; norms in ( ); negative=lacking to 0, positive=beyond 0)  Shoulder:   - Functional ROM:       - Place hand on opposite shoulder:   Left: Normal   Right: Normal        - Touch top of head:   Left: Normal   Right: Normal        - Place hand behind neck:   Left: Normal   Right: Normal        - Place hand behind back:   Left: Normal   Right: Normal        - Over head reach:   Left: Normal   Cervical:    - Flexion (45-50): 24 (right cervical spine pain)°    - Extension (45-60): 10 (right lateral cervical pain)°    - Rotation (60-80):         Left: 21 (pulling right cervcial spine - lateral)°         Right: 20 (pulling right cervical spine - lateral)°  Comments: Pain in right upper trapezius area reported with reach behind back and overhead with right upper extremity          Palpation  Left  - Levator Scapulae: hypertonic  - Upper Trapezius: hypertonic  Right  - Cervical Paraspinals: tenderness  - Sub Occipitals: tenderness  - Sternocleidomastoid: tenderness  - Anterior Scalenes: tenderness and hypertonic  - Middle Scalenes: tenderness and hypertonic  - Posterior Scalenes: tenderness and hypertonic  - Levator Scapulae: hypertonic and tenderness  - Upper Trapezius: hypertonic and tenderness      Deep Tendon Reflexes  - Biceps (C5):  Left: 2+ (normal)  Right: 2+ (normal)  - Brachioradialis (C6):  Left: 2+ (normal)  Right: 2+ (normal)  - Triceps (C7):  Left: 2+ (normal)  Right: 2+ (normal)    Sensation/Dermatome  Testing:    Tingling in left hand and all fingers reported, light touch intact and equal bilaterally elbows to fingertips           Outcome/Assessments  Outcome Measures:   Neck Disability Index (NDI): Neck Disability Index Score: 29  NDI Total Possible Score: 50  NDI Score Calculated: 58 %  (scored 0-100, higher score indicates higher disability) see flowsheet for additional documentation        Treatment     Therapeutic Exercise  Instructed in the following:    Trial ice/heat x 15 minutes-20 minutes 2x/day and monitor for pain relief    As per MD office note that collar is optional, instructed in weaning from collar for periods of time and performing cervical range of motion as tolerated outside of the collar    Seated cervical retraction with pain posterior right and lateral right cervical spine reported      Skilled input: verbal instruction/cues (PT evaluation, goals, plan of care)    Writer verbally educated and received verbal consent for hand placement, positioning of patient, and techniques to be performed today from patient for hand placement and palpation for techniques and therapist position for techniques as described above and how they are pertinent to the patient's plan of care.  Home Exercise Program  Trial ice/heat x 15 minutes-20 minutes 2x/day and monitor for pain relief    As per MD office note that collar is optional, instructed in weaning from collar for periods of time and performing cervical range of motion as tolerated outside of the collar      ASSESSMENT                                                                                                          64 year old patient has reported functional limitations listed above impacted by signs and symptoms consistent with treatment diagnosis below.  Treatment Diagnosis:   - Involved: cervical.  - Symptoms/impairments: increased pain/symptoms, impaired posture, impaired range of motion, impaired muscle length/flexibility, impaired joint  play/mobility, headaches, impaired strength, impaired activity tolerance and impaired body mechanics.    Patient presents to Physical Therapy after slip/fall on ice in late March with concussion sustained at work while walking in the parking lot at work at [employer] per patient report and headaches and cervical spine pain on right still present.  Headaches right sided also present.  Patient presents in cervical collar with optional use noted by MD in most recent office note, discussed weaning with patient this date.  Extensive time taken to discuss history is subjective portion based on multiple providers and testing to date.  Patient would benefit from continued skilled Physical Therapy to address impairments and return to prior level of function working at least 35 hours/week per patient report with no cervical pain and headaches.    Prognosis: Patient will benefit from skilled therapy. good.  Predicted patient presentation: Moderate (evolving) - Patient comorbidities and complexities, as defined above, may have varying impact on steady progress for prescribed plan of care.  Education:   - Present and ready to learn: patient  - Results of above outlined education: Verbalizes understanding and Needs reinforcement    PLAN                                                                                                                         The following skilled interventions to be implemented to achieve goals listed below:  Activities of Daily Living/Self Care (62357)  Gait Training (62017)  Neuromuscular Re-Education (19092)  Therapeutic Exercise (20187)  Electrical Stimulation Unattended (89224 or )  Heat/Cold (32767)  Ultrasound (79512)  Dry Needling  Manual Therapy (16816)  Therapeutic Activity (28696)    Frequency / Duration  2 times per week tapering as patient progresses for 6 weeks for an estimated total of 12 visits    Patient involved in and agreed to plan of care and goals.  Patient given  attendance policy at time of initial evaluation.    Suggestions for next session as indicated: Progress per plan of care; assess strength of upper extremities, cervical special tests, manual therapy / modalities for pain as needed, continue to progress range of motion and stretching for HEP, monitor progression of weaning from cervical hard collar    Goals  Decrease pain/symptoms to 2/10 maximal  Improve involved strength to bilateral upper extremities equal  Improve involved ROM to 60 degrees of cervical rotation bilateral   The above improvements in impairments to assist in obtaining goals listed below  Long Term Goals: to be met by end of plan of care  1. Patient will be independent with a progressive HEP    2. Patient will demonstrate cervical spine rotation of at least 60 degrees to safely check blind spots while driving and scan environment while ambulating  3. Patient will report ability to lift/carry up to 20# for work related tasks and laundry/grocery tasks at home  4. Patient will report reduced headaches to no more than 1x/week to allow ability for regular shift work related tasks  5. Patient will report ability to perform bathing/grooming/dressing tasks with no cervical pain       Therapy procedure time and total treatment time can be found documented on the Time Entry flowsheet     surgery

## 2024-12-25 PROBLEM — F10.90 ALCOHOL USE: Status: ACTIVE | Noted: 2017-06-12

## 2025-05-21 NOTE — ED CDU PROVIDER NOTE - DISCHARGE DATE
Reports of heavy menstrual periods in the past  With iron-deficiency in 2024  Patient restarted iron supplements consistently a month and a half ago  Hemoglobin 10.7 with microcytosis on most recent CBC on 05/15  Recheck iron studies   17-Jul-2017

## 2025-06-10 NOTE — ED ADULT NURSE NOTE - CAS TRG GENERAL AIRWAY, MLM
Airway    Performed by: Vee Brown MD  Authorized by: Vee Brown MD    Final Airway Type:  Endotracheal airway  Final Endotracheal Airway*:  ETT  ETT Size (mm)*:  7.5  Cuff*:  Regular  Technique Used for Successful ETT Placement:  Direct laryngoscopy  Devices/Methods Used in Placement*:  Mask  Intubation Procedure*:  Preoxygenation, ETCO2, Atraumatic, Dentition Unchanged and Pharynx Clear  Insertion Site:  Oral  Blade Type*:  MAC  Blade Size*:  4  Secured at (cm)*:  22  Placement Verified by: auscultation and capnometry    Glottic View*:  1 - full view of glottis  Attempts*:  1  Location:  OR  Urgency:  Elective  Difficult Airway: No    Indications for Airway Management:  Anesthesia  Mask Difficulty Assessment:  1 - vent by mask  Start Time: 6/10/2025 10:30 AM       Patent

## 2025-08-25 ENCOUNTER — APPOINTMENT (OUTPATIENT)
Dept: OTOLARYNGOLOGY | Facility: CLINIC | Age: 35
End: 2025-08-25
Payer: COMMERCIAL

## 2025-08-25 ENCOUNTER — NON-APPOINTMENT (OUTPATIENT)
Age: 35
End: 2025-08-25

## 2025-08-25 VITALS
RESPIRATION RATE: 18 BRPM | DIASTOLIC BLOOD PRESSURE: 78 MMHG | OXYGEN SATURATION: 99 % | WEIGHT: 235 LBS | SYSTOLIC BLOOD PRESSURE: 118 MMHG | HEIGHT: 69 IN | HEART RATE: 96 BPM | BODY MASS INDEX: 34.8 KG/M2

## 2025-08-25 DIAGNOSIS — R59.0 LOCALIZED ENLARGED LYMPH NODES: ICD-10-CM

## 2025-08-25 PROCEDURE — 76536 US EXAM OF HEAD AND NECK: CPT

## 2025-08-25 PROCEDURE — 99204 OFFICE O/P NEW MOD 45 MIN: CPT

## 2025-08-25 RX ORDER — IBUPROFEN 600 MG
600 TABLET ORAL
Refills: 0 | Status: ACTIVE | COMMUNITY

## 2025-08-27 ENCOUNTER — APPOINTMENT (OUTPATIENT)
Dept: OBGYN | Facility: CLINIC | Age: 35
End: 2025-08-27
Payer: COMMERCIAL

## 2025-08-27 VITALS
BODY MASS INDEX: 34.6 KG/M2 | HEIGHT: 69.5 IN | WEIGHT: 239 LBS | SYSTOLIC BLOOD PRESSURE: 117 MMHG | DIASTOLIC BLOOD PRESSURE: 74 MMHG

## 2025-08-27 DIAGNOSIS — R92.30 DENSE BREASTS, UNSPECIFIED: ICD-10-CM

## 2025-08-27 DIAGNOSIS — L68.0 HIRSUTISM: ICD-10-CM

## 2025-08-27 DIAGNOSIS — Z01.419 ENCOUNTER FOR GYNECOLOGICAL EXAMINATION (GENERAL) (ROUTINE) W/OUT ABNORMAL FINDINGS: ICD-10-CM

## 2025-08-27 DIAGNOSIS — N97.9 FEMALE INFERTILITY, UNSPECIFIED: ICD-10-CM

## 2025-08-27 PROCEDURE — 99204 OFFICE O/P NEW MOD 45 MIN: CPT | Mod: 25

## 2025-08-27 PROCEDURE — 99385 PREV VISIT NEW AGE 18-39: CPT

## 2025-08-27 PROCEDURE — 99459 PELVIC EXAMINATION: CPT | Mod: NC

## 2025-09-02 LAB
17OHP SERPL-MCNC: 13 NG/DL
ANDROST SERPL-MCNC: 90 NG/DL
CYTOLOGY CVX/VAG DOC THIN PREP: ABNORMAL
DHEA-S SERPL-MCNC: 185 UG/DL
ESTRADIOL SERPL-MCNC: 35 PG/ML
HPV HIGH+LOW RISK DNA PNL CVX: NOT DETECTED
LH SERPL-ACNC: 3.4 IU/L
PROLACTIN SERPL-MCNC: 24.8 NG/ML
SHBG SERPL-SCNC: 45.1 NMOL/L
TESTOST FREE SERPL-MCNC: 2.1 PG/ML
TESTOST SERPL-MCNC: 44.5 NG/DL
TSH SERPL-ACNC: 1.8 UIU/ML

## 2025-09-02 RX ORDER — METRONIDAZOLE 500 MG/1
500 TABLET ORAL TWICE DAILY
Qty: 14 | Refills: 0 | Status: ACTIVE | COMMUNITY
Start: 2025-09-02 | End: 1900-01-01